# Patient Record
Sex: MALE | Race: BLACK OR AFRICAN AMERICAN | URBAN - METROPOLITAN AREA
[De-identification: names, ages, dates, MRNs, and addresses within clinical notes are randomized per-mention and may not be internally consistent; named-entity substitution may affect disease eponyms.]

---
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
## 2021-01-11 ENCOUNTER — OFFICE VISIT (OUTPATIENT)
Dept: SURGERY | Facility: CLINIC | Age: 21
End: 2021-01-11
Payer: COMMERCIAL

## 2021-01-11 VITALS — BODY MASS INDEX: 35 KG/M2 | WEIGHT: 230 LBS

## 2021-01-11 DIAGNOSIS — N53.19 EJACULATORY DISORDER: Primary | ICD-10-CM

## 2021-01-11 PROCEDURE — 99243 OFF/OP CNSLTJ NEW/EST LOW 30: CPT | Performed by: UROLOGY

## 2021-01-11 NOTE — PROGRESS NOTES
[address] [facility] Urology  Initial Office Consultation    HPI:   Ariella Whitfield is a 24year old male here today for consultation at the request of, and a copy of this note will be sent to, Santa Harper DO.     Patient with history of depression shanta Genitourinary: Penis exhibits no lesions. Musculoskeletal: Normal range of motion. Neurological: He is alert and oriented to person, place, and time. Skin: Skin is warm and dry. Psychiatric: He has a normal mood and affect.        LABS:  No results

## 2021-01-13 ENCOUNTER — TELEPHONE (OUTPATIENT)
Dept: FAMILY MEDICINE CLINIC | Facility: CLINIC | Age: 21
End: 2021-01-13

## 2021-01-13 NOTE — TELEPHONE ENCOUNTER
Pt father calling asking who pt was referred to. Advised he is not on WENDI. Per father pt is confused as well about referral.    Pt father will have pt call back to clarify.

## 2021-07-26 ENCOUNTER — APPOINTMENT (OUTPATIENT)
Dept: CT IMAGING | Facility: HOSPITAL | Age: 21
End: 2021-07-26
Attending: NURSE PRACTITIONER
Payer: MEDICAID

## 2021-07-26 ENCOUNTER — HOSPITAL ENCOUNTER (EMERGENCY)
Facility: HOSPITAL | Age: 21
Discharge: HOME | End: 2021-07-26
Payer: MEDICAID

## 2021-07-26 VITALS
TEMPERATURE: 98 F | BODY MASS INDEX: 45.47 KG/M2 | OXYGEN SATURATION: 99 % | HEIGHT: 68 IN | DIASTOLIC BLOOD PRESSURE: 73 MMHG | SYSTOLIC BLOOD PRESSURE: 121 MMHG | WEIGHT: 300 LBS | HEART RATE: 82 BPM | RESPIRATION RATE: 16 BRPM

## 2021-07-26 DIAGNOSIS — S09.90XA CLOSED HEAD INJURY, INITIAL ENCOUNTER: Primary | ICD-10-CM

## 2021-07-26 DIAGNOSIS — F41.9 ANXIETY: ICD-10-CM

## 2021-07-26 PROCEDURE — 93010 ELECTROCARDIOGRAM REPORT: CPT | Performed by: NURSE PRACTITIONER

## 2021-07-26 PROCEDURE — 99284 EMERGENCY DEPT VISIT MOD MDM: CPT

## 2021-07-26 PROCEDURE — 93005 ELECTROCARDIOGRAM TRACING: CPT

## 2021-07-26 PROCEDURE — 70450 CT HEAD/BRAIN W/O DYE: CPT | Performed by: NURSE PRACTITIONER

## 2021-07-26 RX ORDER — LORAZEPAM 0.5 MG/1
0.5 TABLET ORAL 2 TIMES DAILY PRN
Qty: 10 TABLET | Refills: 0 | Status: ON HOLD | OUTPATIENT
Start: 2021-07-26 | End: 2021-08-07

## 2021-07-26 RX ORDER — LORAZEPAM 1 MG/1
0.5 TABLET ORAL ONCE
Status: COMPLETED | OUTPATIENT
Start: 2021-07-26 | End: 2021-07-26

## 2021-07-26 NOTE — ED INITIAL ASSESSMENT (HPI)
PT c/o suspected anxiety, state he has been feeling his throat get tight, sob, had episode earlier today which went away when he calmed down.

## 2021-07-28 ENCOUNTER — HOSPITAL ENCOUNTER (EMERGENCY)
Facility: HOSPITAL | Age: 21
Discharge: HOME | End: 2021-07-28
Attending: EMERGENCY MEDICINE
Payer: MEDICAID

## 2021-07-28 VITALS
DIASTOLIC BLOOD PRESSURE: 71 MMHG | HEIGHT: 68 IN | RESPIRATION RATE: 16 BRPM | BODY MASS INDEX: 45.47 KG/M2 | SYSTOLIC BLOOD PRESSURE: 113 MMHG | OXYGEN SATURATION: 97 % | WEIGHT: 300 LBS | HEART RATE: 81 BPM | TEMPERATURE: 98 F

## 2021-07-28 DIAGNOSIS — J02.9 SORE THROAT: Primary | ICD-10-CM

## 2021-07-28 PROCEDURE — 99283 EMERGENCY DEPT VISIT LOW MDM: CPT

## 2021-07-28 RX ORDER — DEXAMETHASONE SODIUM PHOSPHATE 4 MG/ML
8 INJECTION, SOLUTION INTRA-ARTICULAR; INTRALESIONAL; INTRAMUSCULAR; INTRAVENOUS; SOFT TISSUE ONCE
Status: COMPLETED | OUTPATIENT
Start: 2021-07-28 | End: 2021-07-28

## 2021-07-28 NOTE — ED PROVIDER NOTES
Patient Seen in: [facility] AND [facility] Emergency Department      History   Patient presents with:  Sore Throat    Stated Complaint:     HPI/Subjective:   HPI    62-year-old male with history of eczema here with complaints of eating something earlier today an (Temporal)   Resp 16   Ht 172.7 cm (5' 8\")   Wt 136.1 kg   SpO2 97%   BMI 45.61 kg/m²         Physical Exam  Vitals and nursing note reviewed. HENT:      Head: Normocephalic.       Mouth/Throat:      Mouth: Mucous membranes are moist.      Comments: Uvul recommended the patient follow-up with their primary care physician to discuss medications such as Chantix and others to assist with smoking cessation.       Medical Record Review: I personally reviewed available prior medical records for any recent pertine

## 2021-07-31 PROBLEM — F32.1 CURRENT MODERATE EPISODE OF MAJOR DEPRESSIVE DISORDER WITHOUT PRIOR EPISODE (HCC): Status: ACTIVE | Noted: 2021-07-31

## 2021-07-31 PROBLEM — F32.2 SEVERE MAJOR DEPRESSION (HCC): Status: ACTIVE | Noted: 2021-07-31

## 2021-07-31 NOTE — ED NOTES
Met further with Meli Wayne. Updated him that there are no beds at [facility]. Explained that I spoke to [facility], [facility] and they are looking at bed availabilty.   Also explained that if admitted he would be admitted as a self pay, would take to a financial co

## 2021-07-31 NOTE — ED NOTES
Spoke to Amrita Lara in the [address] [place] [address] office. She confirmed they have no beds. The earliest they would have beds is Monday, 8/2/21.

## 2021-07-31 NOTE — ED INITIAL ASSESSMENT (HPI)
Pt c/o SI, pt has no plan at this moment. He states he has been struggling w/his anxiety and has been getting worse.

## 2021-07-31 NOTE — ED PROVIDER NOTES
Patient initially seen by my colleague after presenting with SI.   I evaluated the patient and currently patient is calm and cooperative. The psych border order set has been ordered to address on going needs.   The tentative disposition at this point is tr

## 2021-07-31 NOTE — CERTIFICATION
Ref: [address] 5/3-403, 5/3-602, 5/3-607, 5/3-610    5/3-702, 5/3-813, 5/4-306, 5/4-402, 5/4-403    5/4-405, 5/4-501, 5/4-611, 2/8-307   Inpatient Certificate  Re: Hany Garcia    (name)     I personally informed the above-named individual of the pur on his or her behavioral history, to suffer mental or emotional deterioration and is reasonably expected, after such deterioration, to meet the criteria of either paragraph one or paragraph two above;   []  An individual who is developmentally disabled and

## 2021-07-31 NOTE — ED NOTES
Spoke to [employer] sup to review social distance screener, if pt comes to [facility], he does not need any covid restrictions and can have a roommate at this time.

## 2021-07-31 NOTE — PROGRESS NOTES
PSYCHIATRY      Record reviewed, discussed with staff, patient seen. 22 y.o man with major depressive disorder and suicidal ideation. Inpatient psychiatric hospitalization is indicated.   Inpatient certificate written  Ativan prn anxiety while in ED  Fu

## 2021-07-31 NOTE — ED NOTES
Received a call from [facility], mother of Natalia Peralta. She was crying and is worried about Camari. Mother reports a history of depression on and off for a number of years. He used to see a therapist, but is not seeing one currently.   Natalia Peralta was prescri

## 2021-07-31 NOTE — PROGRESS NOTES
07/31/21 0011   Vitals   Temp 98.1 °F (36.7 °C)   Pulse 89   Resp 18   /83   Height and Weight   Height 5' 8\"   Weight 300 lb   BMI (Calculated) 45.6

## 2021-07-31 NOTE — PROGRESS NOTES
07/31/21 0756   COVID Exposure Risk Screening   Have you been practicing social distancing? Yes   Have you been wearing a mask when in the community? Yes  (Received first dose of COVID vaccination 2 or 3 days ago. )   Are the people you live with followi

## 2021-07-31 NOTE — ED PROVIDER NOTES
Patient Seen in: [facility] AND [facility] Emergency Department      History   Patient presents with:  Eval-P    Stated Complaint: eval-p    HPI/Subjective:   HPI    13-year-old male with history of anxiety, here with complaints of suicidal ideation for the past °F (36.7 °C)   Temp src Oral   SpO2 98 %   O2 Device None (Room air)       Current:/83   Pulse 89   Temp 98.1 °F (36.7 °C) (Oral)   Resp 18   Ht 172.7 cm (5' 8\")   Wt 136.1 kg   SpO2 98%   BMI 45.61 kg/m²         Physical Exam  Vitals and nursing no Urine Negative Negative    Cocaine Urine Negative Negative    Ecstasy Urine Negative Negative    Methadone Urine Negative Negative    Opiate Urine Negative Negative    Oxycodone Urine Negative Negative    PCP Urine Negative Negative    Creatinine Ur Random x10(3) uL    Lymphocyte Absolute 2.36 1.00 - 4.00 x10(3) uL    Monocyte Absolute 0.56 0.10 - 1.00 x10(3) uL    Eosinophil Absolute 0.08 0.00 - 0.70 x10(3) uL    Basophil Absolute 0.02 0.00 - 0.20 x10(3) uL    Immature Granulocyte Absolute 0.02 0.00 - 1.00 and reviewed those reports. Complicating Factors: The patient already has does not have any pertinent problems on file. to contribute to the complexity of his ED evaluation.         EMERGENCY DEPARTMENT MEDICAL DECISION MAKING:  After obtaining the patie

## 2021-07-31 NOTE — ED NOTES
Patient received rights and signed in. Copies provided to patient. Rights and sign in documentation uploaded into patient's chart.

## 2021-07-31 NOTE — ED NOTES
Consulted further with Dr Joanie Snell. Jordan Lakeshia revealed to him that he did experience suicidal thoughts in the past when he was put on an antidepressant in the past.  Jordanusman Asher also revealed that he has thoughts about using father's gun to kill himself.   He denies

## 2021-07-31 NOTE — CONSULTS
[facility]      Report of Psychiatric Consultation    Pipo Mower Patient Status:  Emergency    2000 MRN [id number]   Location [address] Attending Amy E Francisco Javier Calvillo Day # 0 PCP Pete Quick He has been taking some as needed Ativan for anxiety. Routine labs unremarkable and UDS was negative. Past Medical History:   Diagnosis Date   • Anxiety    • Eczema      History reviewed. No pertinent surgical history.   Family History   Problem Relation 07/31/2021     07/31/2021    CO2 22.0 07/31/2021    GLU 75 07/31/2021    CA 8.9 07/31/2021    ALB 3.5 07/31/2021    ALKPHO 95 07/31/2021    BILT 0.6 07/31/2021    TP 8.0 07/31/2021    AST 23 07/31/2021    ALT 43 07/31/2021    ETOH <3 07/31/2021

## 2021-07-31 NOTE — ED NOTES
Updated Dr Disha Peres regarding need for EKG and Liver enzymes for [facility].

## 2021-07-31 NOTE — ED QUICK NOTES
[facility] contacted for bls transfer to [facility], eta 60 min.

## 2021-07-31 NOTE — ED NOTES
Sarah Rosario has been accepted for admission to [facility] under the care of Dr Darshan Pantoja. Phone number to call Rn report is [phone].   Kiana Cortez RN

## 2021-07-31 NOTE — PROGRESS NOTES
07/31/21 0756   COVID Exposure Risk Screening   Have you been practicing social distancing? Yes   Have you been wearing a mask when in the community? Yes   Are the people you live with following social distancing and wearing a mask?  Yes  (Lives with [facility]

## 2021-08-01 PROBLEM — E66.01 MORBID OBESITY (HCC): Status: ACTIVE | Noted: 2021-08-01

## 2021-08-01 PROBLEM — F41.0 PANIC DISORDER (EPISODIC PAROXYSMAL ANXIETY): Status: ACTIVE | Noted: 2021-08-01

## 2021-08-01 PROBLEM — F32.2 SEVERE MAJOR DEPRESSION, SINGLE EPISODE, WITHOUT PSYCHOTIC FEATURES (HCC): Status: ACTIVE | Noted: 2021-07-31

## 2021-08-01 PROBLEM — F41.1 GAD (GENERALIZED ANXIETY DISORDER): Status: ACTIVE | Noted: 2021-08-01

## 2021-08-17 NOTE — ED PROVIDER NOTES
Patient Seen in: [facility] Emergency Department      History   Patient presents with: Anxiety/Panic attack    Stated Complaint: panic attack     HPI/Subjective:   21yom w hx hx of anxiety, depression, obesity reports with anxiety.  Sensation of t Pulmonary effort is normal.      Breath sounds: Normal breath sounds. Abdominal:      General: Bowel sounds are normal.      Palpations: Abdomen is soft. Musculoskeletal:         General: No tenderness or deformity. Normal range of motion.       Cervica Hypertrophied adenoids result in mild-moderate narrowing nasopharyngeal airway.                   Impression   CONCLUSION:   1. Negative noncontrast CT brain. 2. Minimal ethmoid sinusitis.    3. Hypertrophied adenoids resulting in mild to moderate narrowi

## 2021-09-05 ENCOUNTER — APPOINTMENT (OUTPATIENT)
Dept: GENERAL RADIOLOGY | Facility: HOSPITAL | Age: 21
End: 2021-09-05
Attending: EMERGENCY MEDICINE
Payer: COMMERCIAL

## 2021-09-05 PROCEDURE — 71045 X-RAY EXAM CHEST 1 VIEW: CPT | Performed by: EMERGENCY MEDICINE

## 2021-09-06 NOTE — ED QUICK NOTES
Round on patient. Updated with anticipated time for assessment. Patient given breakfast menu. Patient verbalizing anxiety.  Went thru medications with patient, he has multiple prescriptions for lorazepam. He states he took one after leaving [place] ED last

## 2021-09-06 NOTE — ED QUICK NOTES
Pt moved to [facility] due to need for room. Pt expressed understanding. Pt currently sleeping at this time.

## 2021-09-06 NOTE — ED QUICK NOTES
Pt provided and explained d/c instructions, at-home care, follow-up, and rx. Pt in nad at this time. Iv access d/c. Vss. Casiano. Ambulatory. A&ox3. Belongings with pt. All questions and concerns addressed.

## 2021-09-06 NOTE — BH LEVEL OF CARE ASSESSMENT
Crisis Evaluation Assessment    Niecy Bowman YOB: 2000   Age 22year old MRN [id number]   Location [address] Attending No att. providers found      Patient's legal sex: male  Patient identifies as: male  Rubén Castillo to get out of bed and states that because he has nothing to do that he spends majority of the time in his bed. Specifically patient reports that he has been struggling with showering.   Patient reports that his last shower was about 3 days ago and prior to the last time he was feeling some passive and active suicidal thoughts was about 2 weeks ago. Patient reports that he noticed that when he was on Prozac that had led him to feel more suicidal despite being on the medication for nearly a month.   Patient de dad's house however patient endorses that he is currently living with his mother  Maura Josiah Secured: N/A  Discussion of Removal of Firearm/Weapon: N/A  Do you have a firearm owner ID card?: No  Collateral for any access to means/firearms/weapons: No c Janes Gill who he states would potentially be able to prescribe him medication however he needed to send in his insurance information.   Patient is not currently set up with an outpatient program at this time and was interested in doing that given the severit or Feelings: Sadness; Anxious;Depressed  Anxiety Level- FRANCISCA only: Moderate  Appropriateness of Affect: Congruent to mood; Appropriate to situation  Range of Affect: Normal  Stability of Affect: Stable  Attitude toward staff: Co-operative;Open  Speech  Rate o been compliant with and just recently started with a therapist who he is going to meet with this Wednesday. Patient reports that his depression has been worsening as well as his anxiety he feels like he is unable to manage.   Patient reports that because h

## 2021-09-06 NOTE — ED QUICK NOTES
Pt states had been on Prozac and ativan x 1 month for anxiety. States when prescription ran out 2-3 days ago and has not been able to reach the dr that prescribed them to refill them. States has been feeling more anxious, with chest tightness.  States also

## 2021-09-06 NOTE — ED INITIAL ASSESSMENT (HPI)
Pt has been on prozac and ativan for the last few months and ran out of medication on Friday, states he feels like his \"chest is closing around his heart, and he has ringing in his ears. \"

## 2021-09-06 NOTE — ED INITIAL ASSESSMENT (HPI)
Pt presents to ED for SI, pt states went to [facility] for sore throat causing him anxiety, also needing refill on Prozac and Ativan but was unable to get it. Pt states no plan just states, \"I just feel tired\".

## 2021-09-06 NOTE — BH LEVEL OF CARE REASSESSMENT
Level of Care Reassessment Note    The prior assessment completed on 09/06/21 has been reviewed.   The level of care recommended was declined/postponed due to:   Refused Treatment Reason: Needs time to Decide/Discuss      Patient presents today for reassess Intention  Describe: Pt has a hx of SI with intent and was recently admitted to [facility] on 7/31.  Pt denies any hx of suicide attempts  Family History or Personal Lived Experience of Loss or Near Loss by Suicide: Denies      Reassessment  Have you harmed someon Osvaldo Gutierrez is a 22year old male who [facility] [place] [facility] d/t ongoing anxiety and SI. Pt was assessed at [place] [facility] 5 hours prior to coming to [facility] of anxiety and running out of his prescribed psychotropic medication.  Pt reports returning home with Note from Previous Level Of Care Assessment    Crisis Evaluation Assessment           Guru Sherrie YOB: 2000   Age 22year old MRN [id number]   Location [address] Attending No att. providers found agitation or aggression. Patient reports that he has been struggling with the motivation to get out of bed and states that because he has nothing to do that he spends majority of the time in his bed.   Specifically patient reports that he has been struggli current passive or active suicidal thoughts/plans or recent attempts. Patient reports that the last time he was feeling some passive and active suicidal thoughts was about 2 weeks ago.   Patient reports that he noticed that when he was on Prozac that had l weapons; patient reports according to her previous assessment that he did have access to a gun at his dad's house however patient endorses that he is currently living with his mother  Shanda Johnson Secured: N/A  Discussion of Removal of Firearm/Weapon: N/ needed and trazodone 25 to 50 mg per night as needed. Patient does started with a therapist 2 days ago Dr. Toya Perez who he states would potentially be able to prescribe him medication however he needed to send in his insurance information.   Jerardo None  Posture: Relaxed  Rate of Movement: Normal  Mood and Affect  Mood or Feelings: Sadness; Anxious;Depressed  Anxiety Level- FRANCISCA only: Moderate  Appropriateness of Affect: Congruent to mood; Appropriate to situation  Range of Affect: Normal  Stability of Phu at the end of July 2021. Patient was prescribed medication which he has been compliant with and just recently started with a therapist who he is going to meet with this Wednesday.   Patient reports that his depression has been worsening as well as his

## 2021-09-06 NOTE — ED PROVIDER NOTES
Patient Seen in: [facility] Emergency Department      History   Patient presents with:  Eval-P    Stated Complaint: Eval P    HPI/Subjective:   HPI    Patient presents with suicidal ideations.   The patient states that he has a history of a lot of an Normocephalic, atraumatic, pupils equal round and reactive to light, oropharynx clear, uvula midline. Neck: Supple. Cardiovascular: Regular rate and rhythm, no murmurs. Respiratory: Lungs clear to auscultation. Extremities: No CCE. Skin: Warm and dry. criteria for inpatient psychiatric treatment at this time. He has Ativan as prescribed by the physician that saw him yesterday that he can take in the meantime. He was counseled to return if his condition worsened.              Disposition and Plan     Cl

## 2021-09-06 NOTE — ED PROVIDER NOTES
Patient Seen in: [facility] Emergency Department      History   Patient presents with:   Anxiety/Panic attack  Chest Pain Angina    Stated Complaint:     HPI/Subjective:   HPI    Patient presents to the emergency department complaining of anxiety a Eyes:      Conjunctiva/sclera: Conjunctivae normal.   Cardiovascular:      Rate and Rhythm: Normal rate and regular rhythm. Heart sounds: No murmur heard. Pulmonary:      Effort: Pulmonary effort is normal. No respiratory distress.       Breath s Monitor: Pulse Readings from Last 1 Encounters:  09/05/21 : 68  , sinus,      Radiology findings:CXR reviewed, normal  Radiology exams  Viewed and reviewed by myself and findings discussed with patient including need for follow up

## 2021-09-09 PROBLEM — F32.2 SEVERE MAJOR DEPRESSION, SINGLE EPISODE, WITHOUT PSYCHOTIC FEATURES (HCC): Status: RESOLVED | Noted: 2021-07-31 | Resolved: 2021-09-09

## 2021-10-07 ENCOUNTER — OFFICE VISIT (OUTPATIENT)
Dept: FAMILY MEDICINE CLINIC | Facility: CLINIC | Age: 21
End: 2021-10-07
Payer: COMMERCIAL

## 2021-10-07 VITALS
HEART RATE: 73 BPM | HEIGHT: 68 IN | DIASTOLIC BLOOD PRESSURE: 88 MMHG | SYSTOLIC BLOOD PRESSURE: 138 MMHG | WEIGHT: 289.81 LBS | BODY MASS INDEX: 43.92 KG/M2

## 2021-10-07 DIAGNOSIS — N53.19 EJACULATORY DISORDER: ICD-10-CM

## 2021-10-07 DIAGNOSIS — H93.13 TINNITUS OF BOTH EARS: Primary | ICD-10-CM

## 2021-10-07 PROCEDURE — 99213 OFFICE O/P EST LOW 20 MIN: CPT | Performed by: FAMILY MEDICINE

## 2021-10-07 PROCEDURE — 3079F DIAST BP 80-89 MM HG: CPT | Performed by: FAMILY MEDICINE

## 2021-10-07 PROCEDURE — 3008F BODY MASS INDEX DOCD: CPT | Performed by: FAMILY MEDICINE

## 2021-10-07 PROCEDURE — 3075F SYST BP GE 130 - 139MM HG: CPT | Performed by: FAMILY MEDICINE

## 2021-10-07 NOTE — PROGRESS NOTES
Pipo Vaughn is a 22year old male.   Patient presents with:  Ear Problem: ringing in the ears that comes and goes, pt experiencing difficulty hearing at times, denies pain  Sore Throat: pt feels that tonsils swell often but no known hx of strep, someti rashes  RESPIRATORY: denies shortness of breath, cough, wheezing  CARDIOVASCULAR: denies chest pain on exertion, palpitations, swelling in feet  GI: denies abdominal pain and denies heartburn, nausea or vomiting  : No Pain on urination, change in the col

## 2022-03-17 ENCOUNTER — TELEPHONE (OUTPATIENT)
Dept: FAMILY MEDICINE CLINIC | Facility: CLINIC | Age: 22
End: 2022-03-17

## 2022-03-17 ENCOUNTER — HOSPITAL ENCOUNTER (EMERGENCY)
Facility: HOSPITAL | Age: 22
Discharge: HOME | End: 2022-03-17
Payer: COMMERCIAL

## 2022-03-17 VITALS
TEMPERATURE: 98 F | BODY MASS INDEX: 44.43 KG/M2 | SYSTOLIC BLOOD PRESSURE: 102 MMHG | WEIGHT: 300 LBS | RESPIRATION RATE: 20 BRPM | HEART RATE: 91 BPM | OXYGEN SATURATION: 100 % | DIASTOLIC BLOOD PRESSURE: 78 MMHG | HEIGHT: 69 IN

## 2022-03-17 DIAGNOSIS — J02.0 STREP PHARYNGITIS: Primary | ICD-10-CM

## 2022-03-17 DIAGNOSIS — R00.2 PALPITATIONS: ICD-10-CM

## 2022-03-17 LAB
ANION GAP SERPL CALC-SCNC: 6 MMOL/L (ref 0–18)
BASOPHILS # BLD AUTO: 0.04 X10(3) UL (ref 0–0.2)
BASOPHILS NFR BLD AUTO: 0.6 %
BUN BLD-MCNC: 14 MG/DL (ref 7–18)
BUN/CREAT SERPL: 14 (ref 10–20)
CALCIUM BLD-MCNC: 9.2 MG/DL (ref 8.5–10.1)
CHLORIDE SERPL-SCNC: 104 MMOL/L (ref 98–112)
CO2 SERPL-SCNC: 25 MMOL/L (ref 21–32)
CREAT BLD-MCNC: 1 MG/DL
DEPRECATED RDW RBC AUTO: 39.5 FL (ref 35.1–46.3)
EOSINOPHIL # BLD AUTO: 0.19 X10(3) UL (ref 0–0.7)
EOSINOPHIL NFR BLD AUTO: 2.6 %
ERYTHROCYTE [DISTWIDTH] IN BLOOD BY AUTOMATED COUNT: 12.7 % (ref 11–15)
GLUCOSE BLD-MCNC: 87 MG/DL (ref 70–99)
HCT VFR BLD AUTO: 47.7 %
HGB BLD-MCNC: 16 G/DL
IMM GRANULOCYTES # BLD AUTO: 0.01 X10(3) UL (ref 0–1)
IMM GRANULOCYTES NFR BLD: 0.1 %
LYMPHOCYTES # BLD AUTO: 1.3 X10(3) UL (ref 1–4)
LYMPHOCYTES NFR BLD AUTO: 18 %
MCHC RBC AUTO-ENTMCNC: 33.5 G/DL (ref 31–37)
MCV RBC AUTO: 84.7 FL
MONOCYTES # BLD AUTO: 0.54 X10(3) UL (ref 0.1–1)
MONOCYTES NFR BLD AUTO: 7.5 %
NEUTROPHILS # BLD AUTO: 5.13 X10 (3) UL (ref 1.5–7.7)
NEUTROPHILS # BLD AUTO: 5.13 X10(3) UL (ref 1.5–7.7)
NEUTROPHILS NFR BLD AUTO: 71.2 %
OSMOLALITY SERPL CALC.SUM OF ELEC: 280 MOSM/KG (ref 275–295)
PLATELET # BLD AUTO: 317 10(3)UL (ref 150–450)
POTASSIUM SERPL-SCNC: 4 MMOL/L (ref 3.5–5.1)
RBC # BLD AUTO: 5.63 X10(6)UL
S PYO AG THROAT QL: POSITIVE
SODIUM SERPL-SCNC: 135 MMOL/L (ref 136–145)
WBC # BLD AUTO: 7.2 X10(3) UL (ref 4–11)

## 2022-03-17 PROCEDURE — 85025 COMPLETE CBC W/AUTO DIFF WBC: CPT

## 2022-03-17 PROCEDURE — 93010 ELECTROCARDIOGRAM REPORT: CPT | Performed by: INTERNAL MEDICINE

## 2022-03-17 PROCEDURE — 93005 ELECTROCARDIOGRAM TRACING: CPT

## 2022-03-17 PROCEDURE — 87880 STREP A ASSAY W/OPTIC: CPT

## 2022-03-17 PROCEDURE — 36415 COLL VENOUS BLD VENIPUNCTURE: CPT

## 2022-03-17 PROCEDURE — 99284 EMERGENCY DEPT VISIT MOD MDM: CPT

## 2022-03-17 PROCEDURE — 80048 BASIC METABOLIC PNL TOTAL CA: CPT

## 2022-03-17 RX ORDER — PENICILLIN V POTASSIUM 500 MG/1
500 TABLET ORAL 2 TIMES DAILY
Qty: 20 TABLET | Refills: 0 | Status: SHIPPED | OUTPATIENT
Start: 2022-03-17 | End: 2022-03-30

## 2022-03-17 NOTE — ED INITIAL ASSESSMENT (HPI)
Patient reports new prescription of lamotrigine about 2 weeks ago and has been feeling heart palpitations and dizziness since. Denies chest pain.

## 2022-03-17 NOTE — TELEPHONE ENCOUNTER
Patient is currently in ER. Patient feeling \"lightheaded, stunned, feel like stuck in moment\" while on medication. Lamotrigine given by psychiatrist.   Started 2-3 weeks ago. Patient called because he wasn't being called. He hung up after he was called.

## 2022-03-17 NOTE — ED QUICK NOTES
Pt  has been having intermittent heart palpitations x 3 days. States has started new medication about 2 wks ago. States also having a sore throat. Pt now feels that the heart palpitations are related to his anxiety.

## 2022-03-18 ENCOUNTER — LAB ENCOUNTER (OUTPATIENT)
Dept: LAB | Age: 22
End: 2022-03-18
Attending: FAMILY MEDICINE
Payer: COMMERCIAL

## 2022-03-18 ENCOUNTER — OFFICE VISIT (OUTPATIENT)
Dept: FAMILY MEDICINE CLINIC | Facility: CLINIC | Age: 22
End: 2022-03-18
Payer: COMMERCIAL

## 2022-03-18 VITALS
HEART RATE: 70 BPM | TEMPERATURE: 98 F | HEIGHT: 69 IN | BODY MASS INDEX: 44.49 KG/M2 | DIASTOLIC BLOOD PRESSURE: 80 MMHG | SYSTOLIC BLOOD PRESSURE: 132 MMHG | WEIGHT: 300.38 LBS

## 2022-03-18 DIAGNOSIS — E66.01 MORBID OBESITY (HCC): ICD-10-CM

## 2022-03-18 DIAGNOSIS — N48.89 PEARLY PENILE PAPULES: Primary | ICD-10-CM

## 2022-03-18 DIAGNOSIS — Z00.00 ROUTINE PHYSICAL EXAMINATION: ICD-10-CM

## 2022-03-18 DIAGNOSIS — Z11.3 SCREEN FOR STD (SEXUALLY TRANSMITTED DISEASE): ICD-10-CM

## 2022-03-18 PROBLEM — F13.99 SEDATIVE, HYPNOTIC OR ANXIOLYTIC USE, UNSPECIFIED WITH UNSPECIFIED SEDATIVE, HYPNOTIC OR ANXIOLYTIC-INDUCED DISORDER (HCC): Status: ACTIVE | Noted: 2022-03-18

## 2022-03-18 PROBLEM — F13.29 SEDATIVE, HYPNOTIC OR ANXIOLYTIC DEPENDENCE WITH UNSPECIFIED SEDATIVE, HYPNOTIC OR ANXIOLYTIC-INDUCED DISORDER (HCC): Status: ACTIVE | Noted: 2022-03-18

## 2022-03-18 PROBLEM — F13.20 SEDATIVE, HYPNOTIC OR ANXIOLYTIC DEPENDENCE, UNCOMPLICATED (HCC): Status: ACTIVE | Noted: 2022-03-18

## 2022-03-18 PROCEDURE — 3079F DIAST BP 80-89 MM HG: CPT | Performed by: FAMILY MEDICINE

## 2022-03-18 PROCEDURE — 3008F BODY MASS INDEX DOCD: CPT | Performed by: FAMILY MEDICINE

## 2022-03-18 PROCEDURE — 3075F SYST BP GE 130 - 139MM HG: CPT | Performed by: FAMILY MEDICINE

## 2022-03-18 PROCEDURE — 99395 PREV VISIT EST AGE 18-39: CPT | Performed by: FAMILY MEDICINE

## 2022-03-18 RX ORDER — LAMOTRIGINE 25 MG/1
TABLET ORAL
COMMUNITY
Start: 2022-02-28 | End: 2022-03-30

## 2022-03-18 RX ORDER — CLONAZEPAM 0.5 MG/1
0.5 TABLET ORAL 2 TIMES DAILY
COMMUNITY
Start: 2022-01-27 | End: 2022-03-18

## 2022-03-19 ENCOUNTER — HOSPITAL ENCOUNTER (EMERGENCY)
Facility: HOSPITAL | Age: 22
Discharge: HOME | End: 2022-03-19
Attending: EMERGENCY MEDICINE
Payer: COMMERCIAL

## 2022-03-19 VITALS
HEART RATE: 93 BPM | RESPIRATION RATE: 20 BRPM | BODY MASS INDEX: 45.47 KG/M2 | TEMPERATURE: 99 F | DIASTOLIC BLOOD PRESSURE: 71 MMHG | SYSTOLIC BLOOD PRESSURE: 116 MMHG | OXYGEN SATURATION: 97 % | WEIGHT: 300 LBS | HEIGHT: 68 IN

## 2022-03-19 DIAGNOSIS — J02.0 STREPTOCOCCAL SORE THROAT: Primary | ICD-10-CM

## 2022-03-19 PROCEDURE — 96372 THER/PROPH/DIAG INJ SC/IM: CPT

## 2022-03-19 PROCEDURE — 99283 EMERGENCY DEPT VISIT LOW MDM: CPT

## 2022-03-19 RX ORDER — KETOROLAC TROMETHAMINE 15 MG/ML
15 INJECTION, SOLUTION INTRAMUSCULAR; INTRAVENOUS ONCE
Status: COMPLETED | OUTPATIENT
Start: 2022-03-19 | End: 2022-03-19

## 2022-03-19 RX ORDER — DEXAMETHASONE SODIUM PHOSPHATE 4 MG/ML
10 INJECTION, SOLUTION INTRA-ARTICULAR; INTRALESIONAL; INTRAMUSCULAR; INTRAVENOUS; SOFT TISSUE ONCE
Status: COMPLETED | OUTPATIENT
Start: 2022-03-19 | End: 2022-03-19

## 2022-03-19 NOTE — ED INITIAL ASSESSMENT (HPI)
Pt presents to ED via EMS for strep throat. Pt states he was diagnosed earlier today but states his throat feels worse.

## 2022-03-20 LAB
CHLAMYDIA TRACHOMATIS$RNA, TMA: NOT DETECTED
CHOL/HDLC RATIO: 4 (CALC)
CHOLESTEROL, TOTAL: 160 MG/DL
HDL CHOLESTEROL: 40 MG/DL
LDL-CHOLESTEROL: 104 MG/DL (CALC)
NEISSERIA GONORRHOEAE$RNA, TMA: NOT DETECTED
NON-HDL CHOLESTEROL: 120 MG/DL (CALC)
SIGNAL TO CUT-OFF: 0.1
TRIGLYCERIDES: 75 MG/DL

## 2022-03-23 PROBLEM — F33.2 MDD (MAJOR DEPRESSIVE DISORDER), RECURRENT EPISODE, SEVERE (HCC): Status: ACTIVE | Noted: 2022-03-23

## 2022-03-23 NOTE — ED QUICK NOTES
Report called to [facility], spoke with sakina. [facility] called for bls transfer. Eta 60-90min.

## 2022-03-23 NOTE — ED QUICK NOTES
Patient calm and cooperative, asking for food. Packaged lunch without silverware provided to patient.

## 2022-03-23 NOTE — ED QUICK NOTES
Rounding Completed    Bed is locked and in lowest position. Sitter at th bed side. Patient A&O, calm & dev.

## 2022-03-23 NOTE — PROGRESS NOTES
03/23/22 0051   COVID Exposure Risk Screening   Do you have any of the following new or worsening symptoms of COVID-19? None  (recently dx with strep and is on an antibiotic)   Have you been diagnosed with COVID-19 within the past 10 days? No   Are you awaiting COVID-19 test results or do you have a COVID-19 test scheduled? No   In the past 10 days, have you been in contact with someone who was confirmed or suspected to have COVID-19? No     Patient reports having 1 dose of the vaccine. According to records, overdue for the 2nd shot and no booster.

## 2022-03-23 NOTE — ED INITIAL ASSESSMENT (HPI)
Patient arrives via [facility] with complaints of:  Excessive irritability, Intrusive thoughts, Anxiety, blurry vision x2-3 days  2 weeks of confusion and \"concussion symptoms\", patient states he's been clumsy lately. Stopped Lamotrigine on 3/21/22. New rx Lexapro, did not start it yet. Patient reports he took his anxiety medication (unsure of name) during the day today.

## 2022-03-23 NOTE — ED NOTES
This writer discussed the plan of care, admission process and mental health rights. Patient agreed to voluntary behavioral health treatment and was provided a copy of all documents signed. A copy was scanned to EMR and originals were placed in the chart. Patient is requesting to go back to [facility]. Patient gave verbal consent for information regarding his POC can be provided to his mother Julia Kodi, [phone].

## 2022-03-23 NOTE — ED NOTES
Angela Sheldon has been accepted for admission by Dr José Luis Elliott. He will be admitted to room 822B. SBAR Report completed with Maria Eugenia @ ext [phone]. Phone number to call nurse report provided to Altagracia Landin RN.

## 2022-03-23 NOTE — ED PROVIDER NOTES
Patient endorsed to me in setting of anxiety and depression. Pt medically cleared. Pt has concerns for safety at home during discussion with  Monika Wiley. She also d/w patient's mother. Pt has a lot of medicine changes recently and intrusive suicidal thoughts. Plan for psychiatric transfer.

## 2022-04-22 ENCOUNTER — NURSE TRIAGE (OUTPATIENT)
Dept: FAMILY MEDICINE CLINIC | Facility: CLINIC | Age: 22
End: 2022-04-22

## 2022-06-24 ENCOUNTER — HOSPITAL ENCOUNTER (EMERGENCY)
Facility: HOSPITAL | Age: 22
Discharge: LEFT BEFORE DISCHARGE | End: 2022-06-24
Payer: COMMERCIAL

## 2022-06-24 NOTE — ED QUICK NOTES
rn attempted to call patient multiple times, looked in all areas of the waiting room and bathroom.  rn called patient's phone without answer, considered LWBS

## 2022-12-05 ENCOUNTER — NURSE TRIAGE (OUTPATIENT)
Dept: FAMILY MEDICINE CLINIC | Facility: CLINIC | Age: 22
End: 2022-12-05

## 2022-12-07 ENCOUNTER — OFFICE VISIT (OUTPATIENT)
Dept: FAMILY MEDICINE CLINIC | Facility: CLINIC | Age: 22
End: 2022-12-07
Payer: COMMERCIAL

## 2022-12-07 ENCOUNTER — LAB ENCOUNTER (OUTPATIENT)
Dept: LAB | Age: 22
End: 2022-12-07
Attending: FAMILY MEDICINE
Payer: COMMERCIAL

## 2022-12-07 VITALS
WEIGHT: 315 LBS | RESPIRATION RATE: 19 BRPM | HEART RATE: 78 BPM | HEIGHT: 68 IN | SYSTOLIC BLOOD PRESSURE: 133 MMHG | DIASTOLIC BLOOD PRESSURE: 82 MMHG | BODY MASS INDEX: 47.74 KG/M2 | OXYGEN SATURATION: 98 %

## 2022-12-07 DIAGNOSIS — E66.01 CLASS 3 SEVERE OBESITY WITHOUT SERIOUS COMORBIDITY WITH BODY MASS INDEX (BMI) OF 50.0 TO 59.9 IN ADULT, UNSPECIFIED OBESITY TYPE (HCC): ICD-10-CM

## 2022-12-07 DIAGNOSIS — R63.5 WEIGHT GAIN: ICD-10-CM

## 2022-12-07 DIAGNOSIS — Z11.3 SCREENING EXAMINATION FOR STD (SEXUALLY TRANSMITTED DISEASE): Primary | ICD-10-CM

## 2022-12-07 PROCEDURE — 99214 OFFICE O/P EST MOD 30 MIN: CPT | Performed by: FAMILY MEDICINE

## 2022-12-07 PROCEDURE — 3079F DIAST BP 80-89 MM HG: CPT | Performed by: FAMILY MEDICINE

## 2022-12-07 PROCEDURE — 3075F SYST BP GE 130 - 139MM HG: CPT | Performed by: FAMILY MEDICINE

## 2022-12-07 PROCEDURE — 3008F BODY MASS INDEX DOCD: CPT | Performed by: FAMILY MEDICINE

## 2022-12-07 RX ORDER — GABAPENTIN 100 MG/1
CAPSULE ORAL
Refills: 0 | Status: CANCELLED | OUTPATIENT
Start: 2022-12-07

## 2022-12-07 RX ORDER — DESVENLAFAXINE 50 MG/1
50 TABLET, EXTENDED RELEASE ORAL DAILY
COMMUNITY
Start: 2022-11-08 | End: 2022-12-13

## 2022-12-07 RX ORDER — ESCITALOPRAM OXALATE 10 MG/1
15 TABLET ORAL DAILY
Qty: 45 TABLET | Refills: 0 | Status: CANCELLED | OUTPATIENT
Start: 2022-12-07

## 2022-12-07 RX ORDER — DESVENLAFAXINE 50 MG/1
50 TABLET, EXTENDED RELEASE ORAL DAILY
Qty: 90 TABLET | Refills: 0 | Status: CANCELLED | OUTPATIENT
Start: 2022-12-07

## 2022-12-08 LAB
CHLAMYDIA TRACHOMATIS$RNA, TMA: NOT DETECTED
NEISSERIA GONORRHOEAE$RNA, TMA: NOT DETECTED

## 2022-12-13 ENCOUNTER — TELEPHONE (OUTPATIENT)
Dept: FAMILY MEDICINE CLINIC | Facility: CLINIC | Age: 22
End: 2022-12-13

## 2022-12-13 RX ORDER — DESVENLAFAXINE 50 MG/1
50 TABLET, EXTENDED RELEASE ORAL DAILY
Qty: 90 TABLET | Refills: 0 | Status: SHIPPED | OUTPATIENT
Start: 2022-12-13 | End: 2022-12-19

## 2022-12-13 NOTE — TELEPHONE ENCOUNTER
Spoke to patient who stated he did receive medication from his psychiatrist Dr Maria Luisa Hughes from [facility] in [facility] but the patient has been recently out of employment and is trying to get insurance ( medicaid) and has been out of medication for the past 3 days. I stated I would relay message to Dr Pretty Cota.

## 2022-12-18 ENCOUNTER — HOSPITAL ENCOUNTER (EMERGENCY)
Facility: HOSPITAL | Age: 22
Discharge: HOME | End: 2022-12-19
Attending: EMERGENCY MEDICINE
Payer: MEDICAID

## 2022-12-18 DIAGNOSIS — R07.9 CHEST PAIN OF UNCERTAIN ETIOLOGY: ICD-10-CM

## 2022-12-18 DIAGNOSIS — J02.0 STREP PHARYNGITIS: Primary | ICD-10-CM

## 2022-12-18 PROCEDURE — 36415 COLL VENOUS BLD VENIPUNCTURE: CPT

## 2022-12-18 PROCEDURE — 99284 EMERGENCY DEPT VISIT MOD MDM: CPT

## 2022-12-18 PROCEDURE — 93010 ELECTROCARDIOGRAM REPORT: CPT

## 2022-12-18 PROCEDURE — 93005 ELECTROCARDIOGRAM TRACING: CPT

## 2022-12-18 PROCEDURE — 99285 EMERGENCY DEPT VISIT HI MDM: CPT

## 2022-12-19 ENCOUNTER — APPOINTMENT (OUTPATIENT)
Dept: GENERAL RADIOLOGY | Facility: HOSPITAL | Age: 22
End: 2022-12-19
Attending: EMERGENCY MEDICINE
Payer: MEDICAID

## 2022-12-19 ENCOUNTER — TELEPHONE (OUTPATIENT)
Dept: FAMILY MEDICINE CLINIC | Facility: CLINIC | Age: 22
End: 2022-12-19

## 2022-12-19 VITALS
SYSTOLIC BLOOD PRESSURE: 122 MMHG | HEART RATE: 70 BPM | HEIGHT: 69 IN | RESPIRATION RATE: 23 BRPM | BODY MASS INDEX: 46.65 KG/M2 | TEMPERATURE: 98 F | OXYGEN SATURATION: 99 % | WEIGHT: 315 LBS | DIASTOLIC BLOOD PRESSURE: 80 MMHG

## 2022-12-19 LAB
ANION GAP SERPL CALC-SCNC: 4 MMOL/L (ref 0–18)
ATRIAL RATE: 85 BPM
BASOPHILS # BLD AUTO: 0.03 X10(3) UL (ref 0–0.2)
BASOPHILS NFR BLD AUTO: 0.4 %
BUN BLD-MCNC: 16 MG/DL (ref 7–18)
BUN/CREAT SERPL: 16.2 (ref 10–20)
CALCIUM BLD-MCNC: 8.9 MG/DL (ref 8.5–10.1)
CHLORIDE SERPL-SCNC: 106 MMOL/L (ref 98–112)
CO2 SERPL-SCNC: 29 MMOL/L (ref 21–32)
CREAT BLD-MCNC: 0.99 MG/DL
DEPRECATED RDW RBC AUTO: 37.9 FL (ref 35.1–46.3)
EOSINOPHIL # BLD AUTO: 0.21 X10(3) UL (ref 0–0.7)
EOSINOPHIL NFR BLD AUTO: 2.9 %
ERYTHROCYTE [DISTWIDTH] IN BLOOD BY AUTOMATED COUNT: 12.6 % (ref 11–15)
FLUAV + FLUBV RNA SPEC NAA+PROBE: NEGATIVE
FLUAV + FLUBV RNA SPEC NAA+PROBE: NEGATIVE
GFR SERPLBLD BASED ON 1.73 SQ M-ARVRAT: 111 ML/MIN/1.73M2 (ref 60–?)
GLUCOSE BLD-MCNC: 89 MG/DL (ref 70–99)
HCT VFR BLD AUTO: 42.7 %
HGB BLD-MCNC: 14.1 G/DL
IMM GRANULOCYTES # BLD AUTO: 0.02 X10(3) UL (ref 0–1)
IMM GRANULOCYTES NFR BLD: 0.3 %
LYMPHOCYTES # BLD AUTO: 1.57 X10(3) UL (ref 1–4)
LYMPHOCYTES NFR BLD AUTO: 21.4 %
MCH RBC QN AUTO: 27.8 PG (ref 26–34)
MCHC RBC AUTO-ENTMCNC: 33 G/DL (ref 31–37)
MCV RBC AUTO: 84.2 FL
MONOCYTES # BLD AUTO: 0.39 X10(3) UL (ref 0.1–1)
MONOCYTES NFR BLD AUTO: 5.3 %
NEUTROPHILS # BLD AUTO: 5.1 X10 (3) UL (ref 1.5–7.7)
NEUTROPHILS # BLD AUTO: 5.1 X10(3) UL (ref 1.5–7.7)
NEUTROPHILS NFR BLD AUTO: 69.7 %
OSMOLALITY SERPL CALC.SUM OF ELEC: 289 MOSM/KG (ref 275–295)
P AXIS: 77 DEGREES
P-R INTERVAL: 158 MS
PLATELET # BLD AUTO: 293 10(3)UL (ref 150–450)
POTASSIUM SERPL-SCNC: 4.1 MMOL/L (ref 3.5–5.1)
Q-T INTERVAL: 338 MS
QRS DURATION: 98 MS
QTC CALCULATION (BEZET): 402 MS
R AXIS: 32 DEGREES
RBC # BLD AUTO: 5.07 X10(6)UL
RSV RNA SPEC NAA+PROBE: NEGATIVE
S PYO AG THROAT QL: POSITIVE
SARS-COV-2 RNA RESP QL NAA+PROBE: NOT DETECTED
SODIUM SERPL-SCNC: 139 MMOL/L (ref 136–145)
T AXIS: 34 DEGREES
TROPONIN I HIGH SENSITIVITY: 5 NG/L
VENTRICULAR RATE: 85 BPM
WBC # BLD AUTO: 7.3 X10(3) UL (ref 4–11)

## 2022-12-19 PROCEDURE — 87880 STREP A ASSAY W/OPTIC: CPT

## 2022-12-19 PROCEDURE — 0241U SARS-COV-2/FLU A AND B/RSV BY PCR (GENEXPERT): CPT | Performed by: EMERGENCY MEDICINE

## 2022-12-19 PROCEDURE — 80048 BASIC METABOLIC PNL TOTAL CA: CPT | Performed by: EMERGENCY MEDICINE

## 2022-12-19 PROCEDURE — 85025 COMPLETE CBC W/AUTO DIFF WBC: CPT | Performed by: EMERGENCY MEDICINE

## 2022-12-19 PROCEDURE — 84484 ASSAY OF TROPONIN QUANT: CPT | Performed by: EMERGENCY MEDICINE

## 2022-12-19 PROCEDURE — 71045 X-RAY EXAM CHEST 1 VIEW: CPT | Performed by: EMERGENCY MEDICINE

## 2022-12-19 RX ORDER — PENICILLIN V POTASSIUM 500 MG/1
500 TABLET ORAL 2 TIMES DAILY
Qty: 20 TABLET | Refills: 0 | Status: SHIPPED | OUTPATIENT
Start: 2022-12-19 | End: 2022-12-21 | Stop reason: ALTCHOICE

## 2022-12-19 RX ORDER — DESVENLAFAXINE 50 MG/1
50 TABLET, EXTENDED RELEASE ORAL DAILY
Qty: 90 TABLET | Refills: 1 | Status: SHIPPED | OUTPATIENT
Start: 2022-12-19

## 2022-12-19 RX ORDER — GABAPENTIN 100 MG/1
CAPSULE ORAL
Qty: 90 CAPSULE | Refills: 1 | Status: SHIPPED | OUTPATIENT
Start: 2022-12-19

## 2022-12-19 NOTE — DISCHARGE INSTRUCTIONS
Discussed the importance of blood sugar control in the prevention of ocular complications. Return to emergency room for any facial asymmetry, drooling, inability to swallow, shortness of breath, any worsening symptoms. Please follow-up with your primary care provider in the next few days  Return to emergency department for any worsening symptoms including but not limited to worsening chest pain, shortness of breath, worsening symptoms with exertion, lower extremity swelling, weakness, numbness, abdominal pain, etc. Please follow with your primary care provider in the next few days for reevaluation of your symptoms. The Emergency Department is not intended to be a substitute for an effort to provide complete medical care. The imaging, if any, have often been interpreted on a preliminary basis pending final reading by the radiologist. If your blood pressure was greater than 140/90, please have this blood pressure rechecked by your primary care provider in the next several days.

## 2022-12-19 NOTE — ED INITIAL ASSESSMENT (HPI)
Pt AOx4 C/C intermittent sharp left chest pain, non radiating, and intermittent dyspnea x 3 days. PMH anxiety, states this feels different. Normal work of breathing in triage. Adds that he's had a sore throat for a week. Was checked for STDs at [facility] clinic. States that he has been without his gabapentin and antidepressant for a few days.

## 2022-12-19 NOTE — TELEPHONE ENCOUNTER
Pharmacy states that they did not receive the script sent on . Resent. Called patient, confirmed name and . Informed. Patient verbalized understanding and agrees.

## 2022-12-20 ENCOUNTER — HOSPITAL ENCOUNTER (EMERGENCY)
Facility: HOSPITAL | Age: 22
Discharge: HOME | End: 2022-12-20
Attending: EMERGENCY MEDICINE
Payer: COMMERCIAL

## 2022-12-20 VITALS
SYSTOLIC BLOOD PRESSURE: 132 MMHG | HEART RATE: 86 BPM | TEMPERATURE: 99 F | WEIGHT: 315 LBS | HEIGHT: 69 IN | DIASTOLIC BLOOD PRESSURE: 84 MMHG | RESPIRATION RATE: 16 BRPM | BODY MASS INDEX: 46.65 KG/M2 | OXYGEN SATURATION: 98 %

## 2022-12-20 DIAGNOSIS — R53.1 WEAKNESS GENERALIZED: Primary | ICD-10-CM

## 2022-12-20 LAB
ANION GAP SERPL CALC-SCNC: 5 MMOL/L (ref 0–18)
BUN BLD-MCNC: 13 MG/DL (ref 7–18)
BUN/CREAT SERPL: 15.3 (ref 10–20)
CALCIUM BLD-MCNC: 9 MG/DL (ref 8.5–10.1)
CHLORIDE SERPL-SCNC: 109 MMOL/L (ref 98–112)
CO2 SERPL-SCNC: 25 MMOL/L (ref 21–32)
CREAT BLD-MCNC: 0.85 MG/DL
D DIMER PPP FEU-MCNC: 0.3 UG/ML FEU (ref ?–0.5)
GFR SERPLBLD BASED ON 1.73 SQ M-ARVRAT: 127 ML/MIN/1.73M2 (ref 60–?)
GLUCOSE BLD-MCNC: 119 MG/DL (ref 70–99)
OSMOLALITY SERPL CALC.SUM OF ELEC: 289 MOSM/KG (ref 275–295)
POTASSIUM SERPL-SCNC: 3.8 MMOL/L (ref 3.5–5.1)
SODIUM SERPL-SCNC: 139 MMOL/L (ref 136–145)

## 2022-12-20 PROCEDURE — 87591 N.GONORRHOEAE DNA AMP PROB: CPT | Performed by: EMERGENCY MEDICINE

## 2022-12-20 PROCEDURE — 85379 FIBRIN DEGRADATION QUANT: CPT | Performed by: EMERGENCY MEDICINE

## 2022-12-20 PROCEDURE — 99283 EMERGENCY DEPT VISIT LOW MDM: CPT

## 2022-12-20 PROCEDURE — 36415 COLL VENOUS BLD VENIPUNCTURE: CPT

## 2022-12-20 PROCEDURE — 80048 BASIC METABOLIC PNL TOTAL CA: CPT | Performed by: EMERGENCY MEDICINE

## 2022-12-20 PROCEDURE — 87491 CHLMYD TRACH DNA AMP PROBE: CPT | Performed by: EMERGENCY MEDICINE

## 2022-12-20 NOTE — ED INITIAL ASSESSMENT (HPI)
Pt presents to the Er with c/o fatigue ad feeling \"off\" Pt states he was here for the same issue yesterday and diagnosed with strep.  States he has been taking PO ABT however is not helping

## 2022-12-21 LAB
C TRACH DNA SPEC QL NAA+PROBE: NEGATIVE
N GONORRHOEA DNA SPEC QL NAA+PROBE: NEGATIVE

## 2022-12-23 ENCOUNTER — TELEPHONE (OUTPATIENT)
Dept: FAMILY MEDICINE CLINIC | Facility: CLINIC | Age: 22
End: 2022-12-23

## 2022-12-23 RX ORDER — DESVENLAFAXINE 50 MG/1
50 TABLET, EXTENDED RELEASE ORAL DAILY
Qty: 90 TABLET | Refills: 1 | Status: SHIPPED | OUTPATIENT
Start: 2022-12-23

## 2022-12-23 NOTE — TELEPHONE ENCOUNTER
Patient needs medications sent to [facility] mail order pharmacy  . Patient states wrong dose of Gabapentin was sent to [facility]. Patient already picked up medication from [place] and it was for Gabapentin 100 mg once a day. Patient states he takes Gabapentin 300 mg twice a day. Upon chart review, patient has to follow up with doctor for medication to be prescribed. No record of  prescribing Gabapentin 300 mg twice a day. Patient advised to schedule an appointment and agrees. Appointment 1/3/22 with Dr. Al Graf 50 mg was sent to wrong mail order pharmacy. Medication now sent to [facility] pharmacy.

## 2022-12-23 NOTE — TELEPHONE ENCOUNTER
Patient called stating medication was not received at the pharmacy. desvenlafaxine ER 50 MG Oral Tablet 24 Hr & gabapentin 300 MG Oral Cap. Patient is out medication. Would like it resent to [facility].

## 2023-01-03 ENCOUNTER — LAB ENCOUNTER (OUTPATIENT)
Dept: LAB | Age: 23
End: 2023-01-03
Attending: FAMILY MEDICINE
Payer: COMMERCIAL

## 2023-01-03 ENCOUNTER — OFFICE VISIT (OUTPATIENT)
Dept: FAMILY MEDICINE CLINIC | Facility: CLINIC | Age: 23
End: 2023-01-03
Payer: COMMERCIAL

## 2023-01-03 VITALS
OXYGEN SATURATION: 96 % | HEIGHT: 69 IN | DIASTOLIC BLOOD PRESSURE: 80 MMHG | BODY MASS INDEX: 46.65 KG/M2 | SYSTOLIC BLOOD PRESSURE: 120 MMHG | HEART RATE: 92 BPM | WEIGHT: 315 LBS

## 2023-01-03 DIAGNOSIS — F41.1 GAD (GENERALIZED ANXIETY DISORDER): ICD-10-CM

## 2023-01-03 DIAGNOSIS — Z11.3 SCREENING EXAMINATION FOR VENEREAL DISEASE: Primary | ICD-10-CM

## 2023-01-03 DIAGNOSIS — Z11.3 ROUTINE SCREENING FOR STI (SEXUALLY TRANSMITTED INFECTION): Primary | ICD-10-CM

## 2023-01-03 PROCEDURE — 87491 CHLMYD TRACH DNA AMP PROBE: CPT

## 2023-01-03 PROCEDURE — 86780 TREPONEMA PALLIDUM: CPT | Performed by: FAMILY MEDICINE

## 2023-01-03 PROCEDURE — 3074F SYST BP LT 130 MM HG: CPT | Performed by: FAMILY MEDICINE

## 2023-01-03 PROCEDURE — 3079F DIAST BP 80-89 MM HG: CPT | Performed by: FAMILY MEDICINE

## 2023-01-03 PROCEDURE — 36415 COLL VENOUS BLD VENIPUNCTURE: CPT | Performed by: FAMILY MEDICINE

## 2023-01-03 PROCEDURE — 3008F BODY MASS INDEX DOCD: CPT | Performed by: FAMILY MEDICINE

## 2023-01-03 PROCEDURE — 99213 OFFICE O/P EST LOW 20 MIN: CPT | Performed by: FAMILY MEDICINE

## 2023-01-03 PROCEDURE — 87591 N.GONORRHOEAE DNA AMP PROB: CPT

## 2023-01-03 PROCEDURE — 87389 HIV-1 AG W/HIV-1&-2 AB AG IA: CPT | Performed by: FAMILY MEDICINE

## 2023-01-04 LAB
C TRACH DNA SPEC QL NAA+PROBE: NEGATIVE
N GONORRHOEA DNA SPEC QL NAA+PROBE: NEGATIVE
T PALLIDUM AB SER QL: NEGATIVE

## 2023-03-07 ENCOUNTER — OFFICE VISIT (OUTPATIENT)
Dept: FAMILY MEDICINE CLINIC | Facility: CLINIC | Age: 23
End: 2023-03-07

## 2023-03-07 ENCOUNTER — LAB ENCOUNTER (OUTPATIENT)
Dept: LAB | Age: 23
End: 2023-03-07
Payer: COMMERCIAL

## 2023-03-07 VITALS
HEART RATE: 90 BPM | WEIGHT: 315 LBS | HEIGHT: 69 IN | OXYGEN SATURATION: 96 % | BODY MASS INDEX: 46.65 KG/M2 | TEMPERATURE: 99 F | RESPIRATION RATE: 20 BRPM | SYSTOLIC BLOOD PRESSURE: 131 MMHG | DIASTOLIC BLOOD PRESSURE: 82 MMHG

## 2023-03-07 DIAGNOSIS — E66.01 CLASS 3 SEVERE OBESITY WITHOUT SERIOUS COMORBIDITY WITH BODY MASS INDEX (BMI) OF 50.0 TO 59.9 IN ADULT, UNSPECIFIED OBESITY TYPE (HCC): ICD-10-CM

## 2023-03-07 DIAGNOSIS — F41.8 DEPRESSION WITH ANXIETY: ICD-10-CM

## 2023-03-07 DIAGNOSIS — Z00.00 ROUTINE PHYSICAL EXAMINATION: Primary | ICD-10-CM

## 2023-03-07 LAB
EST. AVERAGE GLUCOSE BLD GHB EST-MCNC: 103 MG/DL (ref 68–126)
HBA1C MFR BLD: 5.2 % (ref ?–5.7)

## 2023-03-07 PROCEDURE — 99395 PREV VISIT EST AGE 18-39: CPT

## 2023-03-07 PROCEDURE — 3075F SYST BP GE 130 - 139MM HG: CPT

## 2023-03-07 PROCEDURE — 83036 HEMOGLOBIN GLYCOSYLATED A1C: CPT

## 2023-03-07 PROCEDURE — 3008F BODY MASS INDEX DOCD: CPT

## 2023-03-07 PROCEDURE — 36415 COLL VENOUS BLD VENIPUNCTURE: CPT

## 2023-03-07 PROCEDURE — 3079F DIAST BP 80-89 MM HG: CPT

## 2023-03-07 RX ORDER — HYDROXYZINE 50 MG/1
50 TABLET, FILM COATED ORAL NIGHTLY PRN
Qty: 15 TABLET | Refills: 0 | Status: SHIPPED | OUTPATIENT
Start: 2023-03-07

## 2023-03-07 RX ORDER — VENLAFAXINE HYDROCHLORIDE 37.5 MG/1
37.5 CAPSULE, EXTENDED RELEASE ORAL DAILY
Qty: 30 CAPSULE | Refills: 0 | Status: SHIPPED | OUTPATIENT
Start: 2023-03-07

## 2023-03-10 ENCOUNTER — TELEPHONE (OUTPATIENT)
Dept: FAMILY MEDICINE CLINIC | Facility: CLINIC | Age: 23
End: 2023-03-10

## 2023-03-10 NOTE — TELEPHONE ENCOUNTER
Patient calling ( identified name and  )  Last office visit 3/7/2023    Started 2 new medications  on 3/7   ( Hydroxyzine and Venlafaxine )     , feels Hydroxyzine is making him too drowsy  during the day       Asking for a medication he can take for the anxiety during the day that will not make him sleepy      Allergies reviewed and pharmacy confirmed    Please advise and thank you.         Best call back number: Moon Lim  at [phone]

## 2023-03-10 NOTE — TELEPHONE ENCOUNTER
Advise patient that all anxiety medications will make him sleepy during the day. Can take 1/2 tablet of hydroxyzine to see if that reduces his tiredness throughout day     Venlafaxine is also good for anxiety, may take up to 2 weeks to notice a difference but it is a good medication for anxiety. Can just take venlafaxine by itself without hydroxyzine.

## 2023-03-21 PROBLEM — F31.4 SEVERE DEPRESSED BIPOLAR I DISORDER WITHOUT PSYCHOTIC FEATURES (HCC): Status: ACTIVE | Noted: 2023-03-21

## 2023-03-21 PROBLEM — R45.851 SUICIDAL IDEATION: Status: ACTIVE | Noted: 2023-03-21

## 2023-03-21 PROBLEM — F41.1 GENERALIZED ANXIETY DISORDER: Status: ACTIVE | Noted: 2021-08-01

## 2023-04-21 ENCOUNTER — LAB ENCOUNTER (OUTPATIENT)
Dept: LAB | Age: 23
End: 2023-04-21
Payer: COMMERCIAL

## 2023-04-21 DIAGNOSIS — Z11.3 SCREEN FOR STD (SEXUALLY TRANSMITTED DISEASE): ICD-10-CM

## 2023-04-21 DIAGNOSIS — R30.0 DYSURIA: ICD-10-CM

## 2023-04-21 PROBLEM — F41.1 GAD (GENERALIZED ANXIETY DISORDER): Status: ACTIVE | Noted: 2023-04-21

## 2023-04-21 PROBLEM — F32.9 MAJOR DEPRESSIVE DISORDER: Status: ACTIVE | Noted: 2023-04-21

## 2023-04-21 LAB
BILIRUB UR QL: NEGATIVE
COLOR UR: YELLOW
GLUCOSE UR-MCNC: NEGATIVE MG/DL
HAV IGM SER QL: NONREACTIVE
HBV CORE IGM SER QL: NONREACTIVE
HBV SURFACE AG SERPL QL IA: NONREACTIVE
HCV AB SERPL QL IA: NONREACTIVE
HGB UR QL STRIP.AUTO: NEGATIVE
KETONES UR-MCNC: NEGATIVE MG/DL
LEUKOCYTE ESTERASE UR QL STRIP.AUTO: NEGATIVE
NITRITE UR QL STRIP.AUTO: NEGATIVE
PH UR: 5.5 [PH] (ref 5–8)
PROT UR-MCNC: NEGATIVE MG/DL
SP GR UR REFRACTOMETRY: 1.02 (ref 1–1.03)
SP GR UR STRIP: 1.02 (ref 1–1.03)
UROBILINOGEN UR STRIP-ACNC: 0.2

## 2023-04-21 PROCEDURE — 86780 TREPONEMA PALLIDUM: CPT

## 2023-04-21 PROCEDURE — 87389 HIV-1 AG W/HIV-1&-2 AB AG IA: CPT

## 2023-04-21 PROCEDURE — 87086 URINE CULTURE/COLONY COUNT: CPT

## 2023-04-21 PROCEDURE — 81001 URINALYSIS AUTO W/SCOPE: CPT

## 2023-04-21 PROCEDURE — 87491 CHLMYD TRACH DNA AMP PROBE: CPT

## 2023-04-21 PROCEDURE — 36415 COLL VENOUS BLD VENIPUNCTURE: CPT

## 2023-04-21 PROCEDURE — 80074 ACUTE HEPATITIS PANEL: CPT

## 2023-04-21 PROCEDURE — 87591 N.GONORRHOEAE DNA AMP PROB: CPT

## 2023-04-23 LAB
C TRACH DNA SPEC QL NAA+PROBE: NEGATIVE
N GONORRHOEA DNA SPEC QL NAA+PROBE: NEGATIVE

## 2023-04-24 LAB — T PALLIDUM AB SER QL: NEGATIVE

## 2023-04-27 NOTE — TELEPHONE ENCOUNTER
Patient called to refill medication, did not come through [facility] and was told to call office if he did not receive it     venlafaxine ER 75 MG Oral Capsule SR 24 Hr    traZODone 50 MG Oral Tab

## 2023-04-28 RX ORDER — TRAZODONE HYDROCHLORIDE 50 MG/1
50 TABLET ORAL NIGHTLY PRN
Qty: 30 TABLET | Refills: 1 | Status: SHIPPED | OUTPATIENT
Start: 2023-04-28 | End: 2023-04-28

## 2023-04-28 RX ORDER — VENLAFAXINE HYDROCHLORIDE 75 MG/1
75 CAPSULE, EXTENDED RELEASE ORAL NIGHTLY
Qty: 30 CAPSULE | Refills: 0 | Status: SHIPPED | OUTPATIENT
Start: 2023-04-28

## 2023-04-28 RX ORDER — VENLAFAXINE HYDROCHLORIDE 75 MG/1
75 CAPSULE, EXTENDED RELEASE ORAL NIGHTLY
Qty: 30 CAPSULE | Refills: 1 | Status: SHIPPED | OUTPATIENT
Start: 2023-04-28 | End: 2023-04-28

## 2023-04-28 RX ORDER — TRAZODONE HYDROCHLORIDE 50 MG/1
50 TABLET ORAL NIGHTLY PRN
Qty: 30 TABLET | Refills: 0 | Status: SHIPPED | OUTPATIENT
Start: 2023-04-28

## 2023-04-28 NOTE — TELEPHONE ENCOUNTER
The patient was informed. He sated he  needs a short term supply to go to [place]. He will call [facility] and cancel the once that were sent. I pended a 30 day to [facility]. I cannot sent due to interaction alert.

## 2023-05-04 PROBLEM — R30.0 DYSURIA: Status: RESOLVED | Noted: 2023-04-21 | Resolved: 2023-05-04

## 2023-05-04 PROBLEM — E78.5 DYSLIPIDEMIA (HIGH LDL; LOW HDL): Status: ACTIVE | Noted: 2023-05-04

## 2023-05-04 PROBLEM — Z00.00 ROUTINE PHYSICAL EXAMINATION: Status: RESOLVED | Noted: 2023-03-07 | Resolved: 2023-05-04

## 2023-05-04 PROBLEM — E66.1 CLASS 3 DRUG-INDUCED OBESITY WITH SERIOUS COMORBIDITY AND BODY MASS INDEX (BMI) OF 50.0 TO 59.9 IN ADULT (HCC): Status: RESOLVED | Noted: 2023-05-04 | Resolved: 2023-05-04

## 2023-05-04 PROBLEM — E66.1 CLASS 3 DRUG-INDUCED OBESITY WITH SERIOUS COMORBIDITY AND BODY MASS INDEX (BMI) OF 50.0 TO 59.9 IN ADULT (HCC): Status: ACTIVE | Noted: 2023-05-04

## 2023-05-04 PROBLEM — F50.81 BINGE EATING DISORDER: Status: ACTIVE | Noted: 2023-05-04

## 2023-05-05 ENCOUNTER — TELEPHONE (OUTPATIENT)
Dept: FAMILY MEDICINE CLINIC | Facility: CLINIC | Age: 23
End: 2023-05-05

## 2023-05-05 NOTE — TELEPHONE ENCOUNTER
Per pharmacy on file rx med lisdexamfetamine [facility]) is not covered by patient insurance, pharmacy says that it's up to the doctor if she would like to do a prior Authorization or do a replacement. Yes

## 2023-05-06 NOTE — TELEPHONE ENCOUNTER
We can do the PA but need the form if the pharmacy can send to us, or have pt have insurance send it to us.

## 2023-05-08 ENCOUNTER — TELEPHONE (OUTPATIENT)
Dept: FAMILY MEDICINE CLINIC | Facility: CLINIC | Age: 23
End: 2023-05-08

## 2023-05-09 NOTE — PROGRESS NOTES
HPI:    Patient ID: Shamar Lott is a 24year old male. This patient is a 23-year-old -American male who returns to the clinic still symptomatic and very concerned about the \"fogginess\" that he experiences after every orgasm.   His vision is with clinical psychologist.    Return in about 3 months (around 1/22/2021), or if symptoms worsen or fail to improve.          QL#[id number] Aklief counseling:  Patient advised to apply a pea-sized amount only at bedtime and wait 30 minutes after washing their face before applying.  If too drying, patient may add a non-comedogenic moisturizer.  The most commonly reported side effects including irritation, redness, scaling, dryness, stinging, burning, itching, and increased risk of sunburn.  The patient verbalized understanding of the proper use and possible adverse effects of retinoids.  All of the patient's questions and concerns were addressed.

## 2023-05-12 RX ORDER — ARIPIPRAZOLE 2 MG/1
2 TABLET ORAL DAILY
Qty: 30 TABLET | Refills: 11 | OUTPATIENT
Start: 2023-05-12

## 2023-05-12 RX ORDER — TRAZODONE HYDROCHLORIDE 50 MG/1
TABLET ORAL
Qty: 30 TABLET | Refills: 11 | OUTPATIENT
Start: 2023-05-12

## 2023-05-12 RX ORDER — VENLAFAXINE HYDROCHLORIDE 75 MG/1
CAPSULE, EXTENDED RELEASE ORAL
Qty: 30 CAPSULE | Refills: 11 | OUTPATIENT
Start: 2023-05-12

## 2023-05-12 NOTE — TELEPHONE ENCOUNTER
Please review; Protocol Failed/ no protocol. The original prescription was discontinued on 5/4/2023 by Dianelys Lee DO for the following reason: Therapy completed. Renewing this prescription may not be appropriate.     Rx Pended, authorize if appropriate      Requested Prescriptions   Pending Prescriptions Disp Refills    ARIPIPRAZOLE 2 MG Oral Tab [Pharmacy Med Name: ARIPIPRAZOLE  2MG  TAB] 30 tablet 11     Sig: TAKE 1 TABLET BY MOUTH DAILY       There is no refill protocol information for this order      Refused Prescriptions Disp Refills    VENLAFAXINE ER 75 MG Oral Capsule SR 24 Hr [Pharmacy Med Name: Venlafaxine HCl ER 75 MG Oral Capsule Extended Release 24 Hour] 30 capsule 11     Sig: TAKE 1 CAPSULE BY MOUTH AT  BEDTIME       Psychiatric Non-Scheduled (Anti-Anxiety) Passed - 5/11/2023 10:47 AM        Passed - In person appointment or virtual visit in the past 6 mos or appointment in next 3 mos     Recent Outpatient Visits              1 week ago Binge eating disorder    [facility], [address] [place] [address] James Mcginnis DO    Office Visit    3 weeks ago Major depressive disorder, remission status unspecified, unspecified whether recurrent    [facility], [address] [facility] SCOTTIE Clark    Office Visit    2 months ago Routine physical examination    [address], [address] [facility] SCOTTIE Clark    Office Visit    4 months ago Routine screening for STI (sexually transmitted infection)    Gali Ibrahim MD    Office Visit    4 months ago Depression with anxiety    [facility], [address], [place] Rosemary Ortiz PA-C    Office Visit          Future Appointments         Provider Department Appt Notes    In 3 weeks James Mcginnis DO [facility], [address] [facility] 1 month                 TRAZODONE 48 MG Oral Tab [Pharmacy Med Name: traZODone HCl 50 MG Oral Tablet] 30 tablet 11     Sig: TAKE 1 TABLET BY MOUTH AT NIGHT  AS NEEDED FOR SLEEP , INSOMNIA.        There is no refill protocol information for this order

## 2023-05-22 NOTE — TELEPHONE ENCOUNTER
Please review; no protocol  Medication pended for your review and approval.     Requested Prescriptions   Pending Prescriptions Disp Refills    HYDROXYZINE 50 MG Oral Tab [Pharmacy Med Name: HYDROXYZINE HYDROCHLORIDE  50MG  TAB] 60 tablet 0     Sig: TAKE 1 TABLET BY MOUTH TWICE  DAILY AS NEEDED FOR ANXIETY ,  INSOMNIA.        There is no refill protocol information for this order       LORAZEPAM 1 MG Oral Tab [Pharmacy Med Name: LORazepam 1 MG Oral Tablet] 30 tablet 0     Sig: TAKE 1 TABLET BY MOUTH TWICE  DAILY AS NEEDED FOR ANXIETY       There is no refill protocol information for this order

## 2023-05-22 NOTE — TELEPHONE ENCOUNTER
Patient was suppose to establish care with psychiatrist. Jose Rivas if patient was able to make appointment with psychiatrist.

## 2023-05-24 NOTE — TELEPHONE ENCOUNTER
Patient forgot to schedule an appointment with psychiatrist. He will call today to try to establish care. In the mean time he is asking for a 1 month supply until his appointment.  Medication pended for your review and approval.

## 2023-05-24 NOTE — TELEPHONE ENCOUNTER
Please review. Protocol failed or has no protocol. Requested Prescriptions   Pending Prescriptions Disp Refills    HYDROXYZINE 50 MG Oral Tab [Pharmacy Med Name: HYDROXYZINE HYDROCHLORIDE  50MG  TAB] 60 tablet 0     Sig: TAKE 1 TABLET BY MOUTH TWICE  DAILY AS NEEDED FOR ANXIETY ,  INSOMNIA.        There is no refill protocol information for this order       LORAZEPAM 1 MG Oral Tab [Pharmacy Med Name: LORazepam 1 MG Oral Tablet] 30 tablet 0     Sig: TAKE 1 TABLET BY MOUTH TWICE  DAILY AS NEEDED FOR ANXIETY       There is no refill protocol information for this order          Recent Outpatient Visits              2 weeks ago Binge eating disorder    [facility], [address] [place], [place] [address]clay LatahJames Moreno DO    Office Visit    1 month ago Major depressive disorder, remission status unspecified, unspecified whether recurrent    [facility], [facility] Richie Archibaldpahoclay [place]h [facility] Junes, APRN    Office Visit    2 months ago Routine physical examination    [address], [address] [place] Junes, APRN    Office Visit    4 months ago Routine screening for STI (sexually transmitted infection)    Tucker Zepeda MD    Office Visit    5 months ago Depression with anxiety    [address], [address], [place] Theda Pilot, [place]    Office Visit            Future Appointments         Provider Department Appt Notes    In 1 week James Mcginnis DO [address], [address] [place] 1 month 98

## 2023-05-24 NOTE — TELEPHONE ENCOUNTER
Patient requesting refills of the 2 medications, hydrOXYzine 50 MG Oral Tab, and  LORazepam 1 MG Oral Tab   Through our office, he states that he attempted to make an appointment with a psychiatrist, but had to leave a voice mail, and is awaiting call back, please advise.

## 2023-05-25 RX ORDER — LORAZEPAM 1 MG/1
TABLET ORAL
Qty: 30 TABLET | Refills: 0 | Status: SHIPPED | OUTPATIENT
Start: 2023-05-25

## 2023-05-25 RX ORDER — HYDROXYZINE 50 MG/1
TABLET, FILM COATED ORAL
Qty: 60 TABLET | Refills: 0 | Status: SHIPPED | OUTPATIENT
Start: 2023-05-25

## 2023-06-16 RX ORDER — ARIPIPRAZOLE 2 MG/1
TABLET ORAL
Qty: 30 TABLET | Refills: 11 | OUTPATIENT
Start: 2023-06-16

## 2023-06-16 RX ORDER — VENLAFAXINE HYDROCHLORIDE 75 MG/1
CAPSULE, EXTENDED RELEASE ORAL
Qty: 30 CAPSULE | Refills: 11 | OUTPATIENT
Start: 2023-06-16

## 2023-06-16 NOTE — TELEPHONE ENCOUNTER
Venlafaxine was refilled today. Patient no longer takes aripiprazole (verified with patient), please see interaction warning for trazodone and advise on refill. Requested Prescriptions     Pending Prescriptions Disp Refills    VENLAFAXINE ER 75 MG Oral Capsule SR 24 Hr [Pharmacy Med Name: Venlafaxine HCl ER 75 MG Oral Capsule Extended Release 24 Hour] 30 capsule 11     Sig: TAKE 1 CAPSULE BY MOUTH AT  BEDTIME    TRAZODONE 50 MG Oral Tab [Pharmacy Med Name: traZODone HCl 50 MG Oral Tablet] 30 tablet 11     Sig: TAKE 1 TABLET BY MOUTH AT NIGHT  AS NEEDED FOR SLEEP , INSOMNIA. ARIPIPRAZOLE 2 MG Oral Tab [Pharmacy Med Name: ARIPIPRAZOLE  2MG  TAB] 30 tablet 11     Sig: TAKE 1 TABLET BY MOUTH DAILY      Recent Visits  Date Type Provider Dept   05/04/23 Office Visit My Santos DO [facility]   04/21/23 Office Visit SCOTTIE Parsons [facility]   03/07/23 Office Visit SCOTTIE Parsons [facility]   01/03/23 Office Visit Yandy Cordero MD [facility]   12/07/22 Office Visit Bailey Garner MD [facility]   04/27/22 Office Visit Willy Garland MD [facility]   03/18/22 Office Visit Meme Montejo MD [facility]   Showing recent visits within past 540 days with a meds authorizing provider and meeting all other requirements  Future Appointments  No visits were found meeting these conditions.   Showing future appointments within next 150 days with a meds authorizing provider and meeting all other requirements     Requested Prescriptions   Pending Prescriptions Disp Refills    VENLAFAXINE ER 75 MG Oral Capsule SR 24 Hr [Pharmacy Med Name: Venlafaxine HCl ER 75 MG Oral Capsule Extended Release 24 Hour] 30 capsule 11     Sig: TAKE 1 CAPSULE BY MOUTH AT  BEDTIME       Psychiatric Non-Scheduled (Anti-Anxiety) Passed - 6/16/2023  6:41 AM        Passed - In person appointment or virtual visit in the past 6 mos or appointment in next 3 mos     Recent Outpatient Visits              1 month ago Binge eating disorder    [facility], Trisha Newman, [place]    Office Visit    1 month ago Major depressive disorder, remission status unspecified, unspecified whether recurrent    [facility], Trisha Newman, [facility], APRN    Office Visit    3 months ago Routine physical examination    Kavya Hinton [facility], APRN    Office Visit    5 months ago Routine screening for STI (sexually transmitted infection)    Jeri Altamirano MD    Office Visit    5 months ago Depression with anxiety    [facility], [address], [place] Rose MARILU Cruz    Office Visit                        TRAZODONE 50 MG Oral Tab [place] Med Name: traZODone HCl 50 MG Oral Tablet] 30 tablet 11     Sig: TAKE 1 TABLET BY MOUTH AT NIGHT  AS NEEDED FOR SLEEP , INSOMNIA.        There is no refill protocol information for this order       ARIPIPRAZOLE 2 MG Oral Tab [Pharmacy Med Name: ARIPIPRAZOLE  2MG  TAB] 30 tablet 11     Sig: TAKE 1 TABLET BY MOUTH DAILY       There is no refill protocol information for this order            Recent Outpatient Visits              1 month ago Binge eating disorder    [facility], [place] Gomez Fitzgerald, DO    Office Visit    1 month ago Major depressive disorder, remission status unspecified, unspecified whether recurrent    [facility], Trisha Newman, [facility], APRN    Office Visit    3 months ago Routine physical examination    Kavya Hinton [facility], APRN    Office Visit    5 months ago Routine screening for STI (sexually transmitted infection)    Ivan Garcia MD    Office Visit    5 months ago Depression with anxiety    [facility], [address], [place] Darcus Fearing, [facility]    Office Visit

## 2023-06-17 RX ORDER — VENLAFAXINE HYDROCHLORIDE 75 MG/1
CAPSULE, EXTENDED RELEASE ORAL
Qty: 30 CAPSULE | Refills: 11 | OUTPATIENT
Start: 2023-06-17

## 2023-06-17 RX ORDER — ARIPIPRAZOLE 2 MG/1
TABLET ORAL
Qty: 30 TABLET | Refills: 11 | OUTPATIENT
Start: 2023-06-17

## 2023-06-17 NOTE — TELEPHONE ENCOUNTER
Duplicate request, previously addressed.     90 day e-rx sent 6/16/23 of venlafaxine    See alternative encounter, patient no longer takes aripiprazole

## 2023-06-19 RX ORDER — TRAZODONE HYDROCHLORIDE 50 MG/1
50 TABLET ORAL NIGHTLY
Qty: 30 TABLET | Refills: 0 | Status: SHIPPED | OUTPATIENT
Start: 2023-06-19

## 2023-06-19 NOTE — TELEPHONE ENCOUNTER
Refill sent to pharmacy  Inquire if patient was able to schedule with psychiatrist for further refills     Advise patient of risk of serotonin syndrome when combining trazodone and venlafaxine which include anxiety, restlessness, headache, nausea, vomiting, fast heart rate, fast breathing, and increased sweating.      Encourage patient to only take trazodone as needed

## 2023-06-21 NOTE — TELEPHONE ENCOUNTER
Spoke with pt,  verified, pt is returning our call. Pt was informed of [address] [place] [address] recommendation, pt stated understanding. He still in the process of scheduling with psyche and hoping end of this week he can get schedule.          PAUL

## 2023-06-28 ENCOUNTER — LAB ENCOUNTER (OUTPATIENT)
Dept: LAB | Age: 23
End: 2023-06-28
Attending: FAMILY MEDICINE
Payer: COMMERCIAL

## 2023-06-28 ENCOUNTER — OFFICE VISIT (OUTPATIENT)
Dept: FAMILY MEDICINE CLINIC | Facility: CLINIC | Age: 23
End: 2023-06-28

## 2023-06-28 VITALS
WEIGHT: 315 LBS | BODY MASS INDEX: 46.65 KG/M2 | SYSTOLIC BLOOD PRESSURE: 104 MMHG | HEART RATE: 103 BPM | HEIGHT: 69 IN | DIASTOLIC BLOOD PRESSURE: 71 MMHG

## 2023-06-28 DIAGNOSIS — R94.31 ABNORMAL EKG: ICD-10-CM

## 2023-06-28 DIAGNOSIS — F41.8 DEPRESSION WITH ANXIETY: ICD-10-CM

## 2023-06-28 DIAGNOSIS — R94.31 ABNORMAL EKG: Primary | ICD-10-CM

## 2023-06-28 DIAGNOSIS — E66.01 CLASS 3 SEVERE OBESITY DUE TO EXCESS CALORIES WITHOUT SERIOUS COMORBIDITY WITH BODY MASS INDEX (BMI) OF 50.0 TO 59.9 IN ADULT (HCC): ICD-10-CM

## 2023-06-28 PROCEDURE — 3074F SYST BP LT 130 MM HG: CPT | Performed by: FAMILY MEDICINE

## 2023-06-28 PROCEDURE — 93010 ELECTROCARDIOGRAM REPORT: CPT | Performed by: INTERNAL MEDICINE

## 2023-06-28 PROCEDURE — 99214 OFFICE O/P EST MOD 30 MIN: CPT | Performed by: FAMILY MEDICINE

## 2023-06-28 PROCEDURE — 3078F DIAST BP <80 MM HG: CPT | Performed by: FAMILY MEDICINE

## 2023-06-28 PROCEDURE — 93005 ELECTROCARDIOGRAM TRACING: CPT

## 2023-06-28 PROCEDURE — 3008F BODY MASS INDEX DOCD: CPT | Performed by: FAMILY MEDICINE

## 2023-06-28 RX ORDER — ARIPIPRAZOLE 2 MG/1
TABLET ORAL
COMMUNITY
Start: 2023-05-19 | End: 2023-06-28

## 2023-06-28 RX ORDER — METFORMIN HYDROCHLORIDE 500 MG/1
500 TABLET, EXTENDED RELEASE ORAL
Qty: 90 TABLET | Refills: 3 | Status: SHIPPED | OUTPATIENT
Start: 2023-06-28

## 2023-06-29 LAB
ATRIAL RATE: 74 BPM
P AXIS: 49 DEGREES
P-R INTERVAL: 128 MS
Q-T INTERVAL: 374 MS
QRS DURATION: 100 MS
QTC CALCULATION (BEZET): 415 MS
R AXIS: 40 DEGREES
T AXIS: 40 DEGREES
VENTRICULAR RATE: 74 BPM

## 2023-08-07 NOTE — TELEPHONE ENCOUNTER
Dear Santino Romero Staff can you please call patient to verify if they are seeing their psychiatrist for medication refill. [employer] message was sent as well to verify,  see note below thank you. Assessment  ASSESSMENT/PLAN:   1. Major depressive disorder, remission status unspecified, unspecified whether recurrent  - Medication refills given until patient established with psychiatrist.   - Advised patient to call office if further refills needed before appointment with psychiatry  - Go to ER for SI/HI.  Patient agreed   - OP REFERRAL TO [facility]

## 2023-08-08 RX ORDER — ARIPIPRAZOLE 2 MG/1
2 TABLET ORAL DAILY
Qty: 30 TABLET | Refills: 11 | OUTPATIENT
Start: 2023-08-08

## 2023-08-08 NOTE — TELEPHONE ENCOUNTER
Cristian De Leons pt needs a refill on his HYDROXYZINE 50 MG Oral Tab pt not able to find a psychiatrist. Please advise

## 2023-08-09 RX ORDER — HYDROXYZINE 50 MG/1
50 TABLET, FILM COATED ORAL 2 TIMES DAILY PRN
Qty: 30 TABLET | Refills: 0 | Status: SHIPPED | OUTPATIENT
Start: 2023-08-09

## 2023-08-11 RX ORDER — ARIPIPRAZOLE 2 MG/1
2 TABLET ORAL DAILY
Qty: 30 TABLET | Refills: 11 | OUTPATIENT
Start: 2023-08-11

## 2023-08-12 NOTE — TELEPHONE ENCOUNTER
Refill not appropriate. \"The original prescription was discontinued on 5/4/2023 by Ny Xiao DO for the following reason: Therapy completed. Renewing this prescription may not be appropriate. \"  Requested Prescriptions   Pending Prescriptions Disp Refills    ARIPIPRAZOLE 2 MG Oral Tab [Pharmacy Med Name: ARIPIPRAZOLE  2MG  TAB] 30 tablet 11     Sig: TAKE 1 TABLET BY MOUTH DAILY       There is no refill protocol information for this order

## 2023-09-07 RX ORDER — HYDROXYZINE 50 MG/1
50 TABLET, FILM COATED ORAL 2 TIMES DAILY PRN
Qty: 30 TABLET | Refills: 0 | Status: SHIPPED | OUTPATIENT
Start: 2023-09-07

## 2023-09-07 NOTE — TELEPHONE ENCOUNTER
Please review. Protocol failed / No protocol.     Requested Prescriptions   Pending Prescriptions Disp Refills    HYDROXYZINE 50 MG Oral Tab [Pharmacy Med Name: HYDROXYZINE HYDROCHLORIDE  50MG  TAB] 30 tablet 0     Sig: TAKE 1 TABLET BY MOUTH TWICE  DAILY AS NEEDED FOR ANXIETY AND  INSOMNIA       There is no refill protocol information for this order              Recent Outpatient Visits              2 months ago Abnormal EKG    [facility] [address] [place] [address]    Office Visit    4 months ago Binge eating disorder    [facility], [address] [place], [place] [address]Mary Moreno,     Office Visit    4 months ago Major depressive disorder, remission status unspecified, unspecified whether recurrent    [facility], [address] [place], [place] [address]SCOTTIE Isaacs    Office Visit    6 months ago Routine physical examination    [facility] [place] SCOTT[place] Castañeda    Office Visit    8 months ago Routine screening for STI (sexually transmitted infection)    [facility]    [facility]

## 2023-09-26 RX ORDER — HYDROXYZINE 50 MG/1
50 TABLET, FILM COATED ORAL 2 TIMES DAILY PRN
Qty: 30 TABLET | Refills: 0 | Status: SHIPPED | OUTPATIENT
Start: 2023-09-26

## 2023-09-26 RX ORDER — VENLAFAXINE HYDROCHLORIDE 75 MG/1
75 CAPSULE, EXTENDED RELEASE ORAL NIGHTLY
Qty: 90 CAPSULE | Refills: 1 | Status: SHIPPED | OUTPATIENT
Start: 2023-09-26

## 2023-09-26 RX ORDER — ARIPIPRAZOLE 2 MG/1
2 TABLET ORAL DAILY
Qty: 30 TABLET | Refills: 11 | OUTPATIENT
Start: 2023-09-26

## 2023-09-26 NOTE — TELEPHONE ENCOUNTER
Refill passed per [facility] protocol. Please review pended refill request as unable to refill due to high/very high drug interaction warning copied here:    High  Drug-Drug: traZODone and venlafaxine ERSerotonergic effects of trazodone and serotonin/norepinephrine reuptake inhibitors (SNRIs) may be additive. The risk of serotonin syndrome/toxicity may be increased.     Requested Prescriptions   Pending Prescriptions Disp Refills       VENLAFAXINE ER 75 MG Oral Capsule SR 24 Hr [Pharmacy Med Name: Venlafaxine HCl ER 75 MG Oral Capsule Extended Release 24 Hour] 30 capsule 11     Sig: TAKE 1 CAPSULE BY MOUTH AT  BEDTIME       Psychiatric Non-Scheduled (Anti-Anxiety) Passed - 9/25/2023  9:41 PM        Passed - In person appointment or virtual visit in the past 6 mos or appointment in next 3 mos     Recent Outpatient Visits              3 months ago Abnormal EKG    Estefany Grajeda, [address], [place]    Office Visit    4 months ago [facility] Kerman Gruber[facility]    Office Visit    5 months ago Major depressive disorder, remission status unspecified, unspecified whether recurrent    [facility], Johnson Camacho APRN    Office Visit    6 months ago Routine physical examination    Johnson Castillo APRN    Office Visit    8 months ago Routine screening for STI (sexually transmitted infection)    Leonor Benavides MD    Office Visit                           Recent Outpatient Visits              3 months ago Abnormal EKG    Estefany Grajeda, [address]  [address], [place] [facility] [place] [facility]    Office Visit    4 months ago Binge eating disorder    [facility] [address] Kerman Gruber DO    Office Visit    5 months ago Major depressive disorder, remission status unspecified, unspecified whether recurrent    [facility], [facility] [place]    Office Visit    6 months ago Routine physical examination    [facility], [place] SCOTT[place] Lorenzo    Office Visit    8 months ago Routine screening for STI (sexually transmitted infection)    Sharif Foy MD    Office Visit

## 2023-09-26 NOTE — TELEPHONE ENCOUNTER
Please review refill protocol failed/ no protocol  Requested Prescriptions   Pending Prescriptions Disp Refills    HYDROXYZINE 50 MG Oral Tab [Pharmacy Med Name: HYDROXYZINE HYDROCHLORIDE  50MG  TAB] 30 tablet 0     Sig: TAKE 1 TABLET BY MOUTH TWICE  DAILY AS NEEDED FOR ANXIETY       There is no refill protocol information for this order

## 2023-10-13 RX ORDER — TRAZODONE HYDROCHLORIDE 50 MG/1
50 TABLET ORAL NIGHTLY
Qty: 90 TABLET | Refills: 3 | OUTPATIENT
Start: 2023-10-13

## 2023-10-13 NOTE — TELEPHONE ENCOUNTER
Please review; protocol failed.    Requested Prescriptions   Pending Prescriptions Disp Refills    TRAZODONE 50 MG Oral Tab [Pharmacy Med Name: traZODone HCl 50 MG Oral Tablet] 30 tablet 11     Sig: TAKE 1 TABLET BY MOUTH EVERY  NIGHT       There is no refill protocol information for this order        Recent Outpatient Visits              3 months ago Abnormal EKG    [place]    Office Visit    5 months ago Binge eating disorder    [facility], [address] [place]     Office Visit    5 months ago Major depressive disorder, remission status unspecified, unspecified whether recurrent    [facility] [address], [place]    Office Visit    7 months ago Routine physical examination    Johnosn Knox APRN    Office Visit    9 months ago Routine screening for STI (sexually transmitted infection)    Fer Knox MD    Office Visit

## 2023-11-06 NOTE — TELEPHONE ENCOUNTER
Please review. Protocol failed/ No protocol.    Requested Prescriptions   Pending Prescriptions Disp Refills    hydrOXYzine 50 MG Oral Tab [Pharmacy Med Name: HYDROXYZINE HYDROCHLORIDE  50MG  TAB] 60 tablet 2     Sig: Take 1 tablet (50 mg total) by mouth 2 (two) times daily as needed for Anxiety.       There is no refill protocol information for this order          Recent Outpatient Visits              4 months ago Abnormal EKG    [facility]    Office Visit    6 months ago Binge eating disorder    [facility]     Office Visit    6 months ago Major depressive disorder, remission status unspecified, unspecified whether recurrent    [facility]    [facility]    8 months ago Routine physical examination    [facility] [address], APRN    Office Visit    10 months ago Routine screening for STI (sexually transmitted infection)    [facility]    [facility] Visit

## 2023-11-06 NOTE — TELEPHONE ENCOUNTER
Please review. Protocol failed/ No protocol.    Requested Prescriptions   Pending Prescriptions Disp Refills    LORAZEPAM 1 MG Oral Tab [Pharmacy Med Name: LORazepam 1 MG Oral Tablet] 30 tablet 0     Sig: TAKE 1 TABLET BY MOUTH TWICE  DAILY AS NEEDED FOR ANXIETY       There is no refill protocol information for this order       ARIPIPRAZOLE 2 MG Oral Tab [Pharmacy Med Name: ARIPIPRAZOLE  2MG  TAB] 30 tablet 11     Sig: TAKE 1 TABLET BY MOUTH DAILY       There is no refill protocol information for this order          Recent Outpatient Visits              4 months ago Abnormal EKG    [facility]     Office Visit    6 months ago Binge eating disorder    [facility]    Office Visit    6 months ago Major depressive disorder, remission status unspecified, unspecified whether recurrent    [facility] [address] Jennifer, APRN    Office Visit    8 months ago Routine physical examination    [facility] [address] Jennifer, APRN    Office Visit    10 months ago Routine screening for STI (sexually transmitted infection)    [facility]    Office Visit

## 2023-11-07 RX ORDER — ARIPIPRAZOLE 2 MG/1
2 TABLET ORAL DAILY
Qty: 30 TABLET | Refills: 11 | OUTPATIENT
Start: 2023-11-07

## 2023-11-07 RX ORDER — LORAZEPAM 1 MG/1
1 TABLET ORAL 2 TIMES DAILY PRN
Qty: 30 TABLET | Refills: 0 | OUTPATIENT
Start: 2023-11-07

## 2023-11-07 RX ORDER — HYDROXYZINE 50 MG/1
50 TABLET, FILM COATED ORAL 2 TIMES DAILY PRN
Qty: 60 TABLET | Refills: 2 | OUTPATIENT
Start: 2023-11-07

## 2023-12-13 RX ORDER — HYDROXYZINE 50 MG/1
50 TABLET, FILM COATED ORAL 2 TIMES DAILY PRN
Qty: 30 TABLET | Refills: 0 | Status: SHIPPED | OUTPATIENT
Start: 2023-12-13 | End: 2024-01-10

## 2023-12-13 NOTE — TELEPHONE ENCOUNTER
Please review.  Protocol failed / Has no protocol.    Dr. Romeo is marked PCP, but hasn't seen patient since 10/2020    Recent visits with SCTOTIE Villela and Dr. Paris     Requested Prescriptions   Pending Prescriptions Disp Refills    HYDROXYZINE 50 MG Oral Tab [Pharmacy Med Name: HYDROXYZINE HYDROCHLORIDE  50MG  TAB] 30 tablet 0     Sig: TAKE 1 TABLET BY MOUTH TWICE  DAILY AS NEEDED FOR ANXIETY       There is no refill protocol information for this order           Recent Outpatient Visits              5 months ago Abnormal EKG    [facility]     Office Visit    7 months ago Binge eating disorder    [facility],     Office Visit    7 months ago Major depressive disorder, remission status unspecified, unspecified whether recurrent    [facility] [address] Jennifer, APRN    Office Visit    9 months ago Routine physical examination    [facility] [address] Jennifer, APRN    Office Visit    11 months ago Routine screening for STI (sexually transmitted infection)    [facility]    Office Visit

## 2023-12-29 ENCOUNTER — HOSPITAL ENCOUNTER (EMERGENCY)
Facility: HOSPITAL | Age: 23
Discharge: NURSING, REHAB OR LONG-TERM CARE | End: 2023-12-30
Attending: EMERGENCY MEDICINE
Payer: COMMERCIAL

## 2023-12-29 DIAGNOSIS — F22 PARANOIA (HCC): ICD-10-CM

## 2023-12-29 DIAGNOSIS — F32.A DEPRESSION, UNSPECIFIED DEPRESSION TYPE: Primary | ICD-10-CM

## 2023-12-29 LAB
AMPHET UR QL SCN: NEGATIVE
ANION GAP SERPL CALC-SCNC: 2 MMOL/L (ref 0–18)
BASOPHILS # BLD AUTO: 0.01 X10(3) UL (ref 0–0.2)
BASOPHILS NFR BLD AUTO: 0.1 %
BENZODIAZ UR QL SCN: NEGATIVE
BUN BLD-MCNC: 12 MG/DL (ref 9–23)
BUN/CREAT SERPL: 12.9 (ref 10–20)
CALCIUM BLD-MCNC: 9 MG/DL (ref 8.7–10.4)
CANNABINOIDS UR QL SCN: NEGATIVE
CHLORIDE SERPL-SCNC: 108 MMOL/L (ref 98–112)
CO2 SERPL-SCNC: 27 MMOL/L (ref 21–32)
COCAINE UR QL: NEGATIVE
CREAT BLD-MCNC: 0.93 MG/DL
CREAT UR-SCNC: 475.4 MG/DL
DEPRECATED RDW RBC AUTO: 41 FL (ref 35.1–46.3)
EGFRCR SERPLBLD CKD-EPI 2021: 119 ML/MIN/1.73M2 (ref 60–?)
EOSINOPHIL # BLD AUTO: 0.28 X10(3) UL (ref 0–0.7)
EOSINOPHIL NFR BLD AUTO: 3.4 %
ERYTHROCYTE [DISTWIDTH] IN BLOOD BY AUTOMATED COUNT: 13.6 % (ref 11–15)
ETHANOL SERPL-MCNC: <3 MG/DL (ref ?–3)
GLUCOSE BLD-MCNC: 76 MG/DL (ref 70–99)
HCT VFR BLD AUTO: 39.5 %
HGB BLD-MCNC: 13.2 G/DL
IMM GRANULOCYTES # BLD AUTO: 0.02 X10(3) UL (ref 0–1)
IMM GRANULOCYTES NFR BLD: 0.2 %
LYMPHOCYTES # BLD AUTO: 1.2 X10(3) UL (ref 1–4)
LYMPHOCYTES NFR BLD AUTO: 14.5 %
MCH RBC QN AUTO: 27.7 PG (ref 26–34)
MCHC RBC AUTO-ENTMCNC: 33.4 G/DL (ref 31–37)
MCV RBC AUTO: 82.8 FL
MDMA UR QL SCN: NEGATIVE
MONOCYTES # BLD AUTO: 0.46 X10(3) UL (ref 0.1–1)
MONOCYTES NFR BLD AUTO: 5.5 %
NEUTROPHILS # BLD AUTO: 6.32 X10 (3) UL (ref 1.5–7.7)
NEUTROPHILS # BLD AUTO: 6.32 X10(3) UL (ref 1.5–7.7)
NEUTROPHILS NFR BLD AUTO: 76.3 %
OPIATES UR QL SCN: NEGATIVE
OSMOLALITY SERPL CALC.SUM OF ELEC: 283 MOSM/KG (ref 275–295)
OXYCODONE UR QL SCN: NEGATIVE
PLATELET # BLD AUTO: 330 10(3)UL (ref 150–450)
POTASSIUM SERPL-SCNC: 4 MMOL/L (ref 3.5–5.1)
RBC # BLD AUTO: 4.77 X10(6)UL
SARS-COV-2 RNA RESP QL NAA+PROBE: NOT DETECTED
SODIUM SERPL-SCNC: 137 MMOL/L (ref 136–145)
WBC # BLD AUTO: 8.3 X10(3) UL (ref 4–11)

## 2023-12-29 PROCEDURE — 99285 EMERGENCY DEPT VISIT HI MDM: CPT

## 2023-12-29 PROCEDURE — 85025 COMPLETE CBC W/AUTO DIFF WBC: CPT | Performed by: EMERGENCY MEDICINE

## 2023-12-29 PROCEDURE — 80048 BASIC METABOLIC PNL TOTAL CA: CPT | Performed by: EMERGENCY MEDICINE

## 2023-12-29 PROCEDURE — 80307 DRUG TEST PRSMV CHEM ANLYZR: CPT | Performed by: EMERGENCY MEDICINE

## 2023-12-29 PROCEDURE — 36415 COLL VENOUS BLD VENIPUNCTURE: CPT

## 2023-12-29 PROCEDURE — 82077 ASSAY SPEC XCP UR&BREATH IA: CPT | Performed by: EMERGENCY MEDICINE

## 2023-12-29 RX ORDER — ALPRAZOLAM 0.25 MG/1
0.25 TABLET ORAL ONCE
Status: COMPLETED | OUTPATIENT
Start: 2023-12-29 | End: 2023-12-29

## 2023-12-29 NOTE — ED INITIAL ASSESSMENT (HPI)
Patient brought to ED for psychiatric problem. Patient was found in his neighbors' yard after trespassing when neighbors told him to stop. Patient arrives making rambling statements about [facility]. Patient states he was in his neighbor's yard because, \"I heard on the news about Muslims' homes blowing up and shit and I wanted to protect them\". Patient denies SI/HI. States he has been admitted psychiatrically before and prefers [facility]. Arrives A/Ox4.

## 2023-12-30 VITALS
TEMPERATURE: 98 F | OXYGEN SATURATION: 96 % | DIASTOLIC BLOOD PRESSURE: 91 MMHG | SYSTOLIC BLOOD PRESSURE: 135 MMHG | RESPIRATION RATE: 18 BRPM | HEIGHT: 70 IN | HEART RATE: 97 BPM | WEIGHT: 315 LBS | BODY MASS INDEX: 45.1 KG/M2

## 2023-12-30 PROBLEM — F20.9 SCHIZOPHRENIA (HCC): Status: ACTIVE | Noted: 2023-12-30

## 2023-12-30 NOTE — ED QUICK NOTES
[facility] arrived for transport to [facility]. Pt calm and cooperative for transport.

## 2023-12-30 NOTE — BH LEVEL OF CARE ASSESSMENT
Crisis Evaluation Assessment    Louie Downing YOB: 2000   Age 26year old MRN [id number]   Location [address] Attending Audrea Fothergill      Patient's legal sex: male  Patient identifies as: male  Patient's birth sex: male  Preferred pronouns: She/Her    Date of Service: 12/30/2023    Referral Source:  Referral Source  Where was crisis eval performed?: On-site  Referral Source: Legal  Referral Source Info: police    Reason for Crisis Evaluation   Ravi Becerra, 52-year-old male, presents to [facility] by EMS due to disorganized behaviors in neighbors yard. Patient present increase kayla, racing thoughts and ideas, and responding to internal stimuli. Patient has history of behavioral health inpatient outpatient behavioral health treatment. Patient has been medication noncompliant. Patient's family has been concerned about patient's behavior within the last month. Patient has not been able to sleep for at least the last 3 days. Per patient, \" I was trying to get closer to my neighbor who is a Gnosticism. I am learning about the practice of Scientology and I wanted to find out more information. My neighbor became scared and called 911. When the police came I was calm and agreed to come to the emergency room. My family was here with me supporting me. I love music I love making music and I can speak in several different languages. I guess I can go to the hospital to prove that I am not crazy. \"    Collateral  Michael Hernandezty  Mother  [phone]    Per mother, his behavior has been disorganized since Thanksgiving. He has a history of inpatient hospitalization for manic behaviors. I am really happy that the neighbor called 911 to get him some help. We has his family has been trying to get him help since November. He refused.  He has not been compliant with his medications and when that happens he becomes overly talkative, increased spending habits, start to say things that does not make any sense, and decrease in sleep. He has not been the sleep in several days. We want him to get back stable on his medications. He enjoys singing and making music. \"      Suicide Crisis Syndrome:  Suicide Crisis Syndrome  Do you feel trapped with no good options left?: Yes  Are you overwhelmed, or have you lost control by negative thoughts filling your head? : No    Suicide Risk Assessments:  Source of information for CSSR: Patient  In what setting is the screener performed?: in person  1. Have you wished you were dead or wished you could go to sleep and not wake up? (past 30 days): No  2. Have you actually had any thoughts of killing yourself? (past 30 days): No              6. Have you ever done anything, started to do anything, or prepared to do anything to end your life? (lifetime): No     Score - BH OV: No Risk        Protective Factors: Patient reports family as protective factors  Past Suicidal Ideation: Denies            Loss or Near Loss by Suicide of Family or Friend?: No          Patient has history of inpatient outpatient behavioral health treatment. Patient denies feeling suicidal/homicidal ideations. Non-Suicidal Self-Injury:   Patient denies history or current self harming behaviors. Risk to Others  Patient denies history or current homicidal ideations. Per mother, patient never presents a danger to himself or others.       Access to Means:  Access to Means  Has access to means to attempt suicide, self-injure, harm others, or damage property?: Yes  Description of Access: Household items, enviromental  Discussion of Removal of Access to Means: Discuss upon discharge  Access to Firearm/Weapon: No  Discussion of Removal of Firearm/Weapon: Patient denies access  Do you have a firearm owner identification ([place] ) card?: No    Protective Factors:   Protective Factors: Patient reports family as protective factors    Review of Psychiatric Systems:  Patient denies hearing voices, or thinking others are plotting against him. Patient admits to seeing dark shadows at times. Patient reports manic behaviors, not able to sleep, and eating 3 meals per day. Substance Use:  Test dated 12/29/2023 results negative for any controlled substances. Patient denies using any controlled substances. Withdrawal Symptoms  Current Withdrawal Symptoms: No  Functional Achievement:   Patient can independently complete all ADLs grooming, bathing, dressing, feeding, transfer to toilet, and continent. Patient can independently ambulate without durable medical equipment. Ability to Care for Self[de-identified]   Patient can independently complete IADLs. Patient receives assistance from family residing in the home. Current Treatment and Treatment History:  Patient has history inpatient/outpatient behavioral health treatment. Last hospitalization, 2023 due to disorganized behaviors. Patient patient denies any current outpatient treatment providers. Patient has been noncompliant with medications. Patient diagnoses major depression, remission status unspecified, unspecified psychosis, and generalized anxiety. School/Work Performance:  Patient reports working at [employer]. Patient did not provide details regarding full-time/part-time or work attendance. Relevant Social History:  Per mother family has history of schizophrenia.   Patient lives with family, denies legal history, feels family are his protective factors        Hi and Complex (as applicable):     urrent Medical (as applicable):  Current Medical  Do you have a Primary Care Physician?: Yes  Primary Care Physician Name: Montgomery Peabody  Does the Patient Have: None  Active Eating Disorder: No (Hx of binge eating)    EDP Assessment (as applicable):  IBW Calculations  Weight: (!) 374 lb 12.5 oz  BMI (Calculated): 53.8  IBW LBS [place]: 166 LBS  IBW %: 225.77 %  IBW + 10%: 182.6 LBS  IBW - 10%: 149.4 LBS Abuse Assessment:  Abuse Assessment  Physical Abuse: Denies  Verbal Abuse: Denies  Sexual Abuse: Denies  Neglect: Denies  Does anyone say or do something to you that makes you feel unsafe?: No  Have You Ever Been Harmed by a Partner/Caregiver?: No  Health Concerns r/t Abuse: No    Mental Status Exam:   General Appearance  Characteristics: Appropriate clothing  Eye Contact: Direct  Psychomotor Behavior  Gait/Movement: Normal  Abnormal movements: None  Posture: Relaxed  Rate of Movement: Normal  Mood and Affect  Mood or Feelings: Calm  Appropriateness of Affect: Congruent to mood  Range of Affect: Normal  Stability of Affect: Stable  Attitude toward staff: Co-operative  Speech  Rate of Speech: Appropriate  Flow of Speech: Appropriate  Intensity of Volume: Ordinary  Clarity: Clear  Cognition  Concentration: Unimpaired  Memory: Recent memory intact; Remote memory intact  Orientation Level: Oriented X4;Oriented to person;Oriented to place;Oriented to time;Oriented to situation  Insight: Poor  Fair/poor insight as evidenced by: Patient presents poor insight  Judgment: Poor  Fair/poor judgment as evidenced by: Patient presents poor judgment  Thought Patterns  Clarity/Relevance: Irrelevant to topic  Flow: Pressured;Flight of ideas; Tangential  Content: Grandiose  Level of Consciousness: Alert  Level of Consciousness: Alert  Behavior  Exhibited behavior: Appropriate to situation;Participated      Disposition:    Rationale for Treatment Recommendation:   Sandi Win, 25-year-old male, presents to [place] by EMS due to disorganized behaviors in his neighbors yard. Patient presents alert, oriented x 2, mood anxious, behavior disorganized, speech pressured, memory intact. Patient has history of inpatient/outpatient behavioral health treatment and been noncompliant with medication. Patient denies suicidal ( CSSRS low risk)/homicidal, self harming behaviors, legal history, eating disorder, and hearing voices.   Patient denies usage of controlled substances. Patient has presented disorganized behaviors since November 2023, last hospitalization was summer 2023. Patient has been unable to sleep for several days and responding to internal stimuli. Risk/Protective Factors  Protective Factors: Patient reports family as protective factors    Level of Care Recommendations  Consulted with: Dr. Lexi Muse of Care Recommendation: Inpatient Acute Care  Unit: B2  Reason for Unit Assigned: age/symptoms  Inpatient Criteria: Inability to care for self  Behavioral Precautions: Close Observation  Refused Treatment: No  Education Provided: Call 911 in an Emergency;[facility] Crisis Line Number;Advised to call if condition worsens; Advised to call with questions  Sign-In  Paperwork Signed: Voluntary Admission Form  Inpatient Admission Type: Adult Voluntary Signed  Patient Provided: Rights of Individuals Receiving MH/DD Services;Rights of Petitioned Admittee;Copy of Petition  Patient Verbalized Understanding: Yes      Diagnoses with F-Codes:  Primary Psychiatric Diagnosis  Unspecified Psychosis F 29     Secondary Psychiatric Diagnoses  Generalized Anxiety  F41.1    Pervasive Diagnoses (as applicable)  NA   Pertinent Non-Psychiatric Diagnoses  ANEL LOPEZ

## 2023-12-30 NOTE — ED NOTES
This writer contacted mother  Titus Fleischer Mother   [phone]    Per mother, his behavior has been disorganized since Thanksgiving. He has a history of inpatient hospitalization for manic behaviors. I am really happy that the neighbor called 911 to get him some help. We has his family has been trying to get him help since November. He refused. He has not been compliant with his medications and when that happens he becomes overly talkative, increased spending habits, start to say things that does not make any sense, and decrease in sleep. He has not been the sleep in several days. We want him to get back stable on his medications. He enjoys singing and making music. \"

## 2023-12-30 NOTE — ED QUICK NOTES
Writer gave RN to RN report to [place] at [facility] at [phone]. [place] reports we can book transport at anytime.

## 2023-12-30 NOTE — ED NOTES
Late Entry    This writer presented to the bedside of patient to complete LOC order by physician. This writer introduced self and purpose of assessment. Patient presented increase kayla, pressured speech, mumbling about his afflictions to [employer] community. Patient memory was intact regarding how he presented to ER. Patient requested this writer to speak with his family. Emily Serrano  completed Voluntary Admission: I have reviewed the Rights of Voluntary Admittee and the Rights of Individuals Receiving Mental Health and Developmental Disability Services   with the patient. I have given a copy of these rights and a copy of the Application For Voluntary Admission to the patient.

## 2024-01-10 PROBLEM — F31.9 AFFECTIVE PSYCHOSIS, BIPOLAR (HCC): Status: ACTIVE | Noted: 2024-01-10

## 2024-02-28 PROBLEM — F41.8 DEPRESSION WITH ANXIETY: Status: RESOLVED | Noted: 2023-03-07 | Resolved: 2024-02-28

## 2024-02-28 PROBLEM — F32.9 MAJOR DEPRESSIVE DISORDER: Status: RESOLVED | Noted: 2023-04-21 | Resolved: 2024-02-28

## 2024-02-28 PROBLEM — E66.01 CLASS 3 SEVERE OBESITY WITHOUT SERIOUS COMORBIDITY WITH BODY MASS INDEX (BMI) OF 50.0 TO 59.9 IN ADULT (HCC): Status: RESOLVED | Noted: 2023-03-07 | Resolved: 2024-02-28

## 2024-04-18 ENCOUNTER — HOSPITAL ENCOUNTER (EMERGENCY)
Facility: HOSPITAL | Age: 24
Discharge: HOME | End: 2024-04-18
Attending: EMERGENCY MEDICINE
Payer: OTHER MISCELLANEOUS

## 2024-04-18 VITALS
OXYGEN SATURATION: 97 % | DIASTOLIC BLOOD PRESSURE: 84 MMHG | SYSTOLIC BLOOD PRESSURE: 157 MMHG | WEIGHT: 315 LBS | RESPIRATION RATE: 20 BRPM | HEIGHT: 69 IN | HEART RATE: 86 BPM | BODY MASS INDEX: 46.65 KG/M2 | TEMPERATURE: 98 F

## 2024-04-18 DIAGNOSIS — M54.50 ACUTE RIGHT-SIDED LOW BACK PAIN, UNSPECIFIED WHETHER SCIATICA PRESENT: Primary | ICD-10-CM

## 2024-04-18 PROCEDURE — 99283 EMERGENCY DEPT VISIT LOW MDM: CPT

## 2024-04-18 PROCEDURE — 96372 THER/PROPH/DIAG INJ SC/IM: CPT

## 2024-04-18 RX ORDER — ACETAMINOPHEN 500 MG
1000 TABLET ORAL ONCE
Status: DISCONTINUED | OUTPATIENT
Start: 2024-04-18 | End: 2024-04-18

## 2024-04-18 RX ORDER — KETOROLAC TROMETHAMINE 15 MG/ML
15 INJECTION, SOLUTION INTRAMUSCULAR; INTRAVENOUS ONCE
Status: COMPLETED | OUTPATIENT
Start: 2024-04-18 | End: 2024-04-18

## 2024-04-18 RX ORDER — CYCLOBENZAPRINE HCL 10 MG
10 TABLET ORAL ONCE
Status: COMPLETED | OUTPATIENT
Start: 2024-04-18 | End: 2024-04-18

## 2024-04-18 NOTE — ED INITIAL ASSESSMENT (HPI)
Patient arrives with reports of lower right back pain starting Monday. States it has been getting worse as the week goes on. Denies injury. Took tylenol 10am with some relief.

## 2024-04-18 NOTE — ED PROVIDER NOTES
Patient Seen in: [facility] Emergency Department      History     Chief Complaint   Patient presents with    Back Pain     Stated Complaint: back Pain    Subjective:   HPI        Objective:   Past Medical History:    Anxiety    Eczema    Headache disorder    Obesity              History reviewed. No pertinent surgical history.             Social History     Socioeconomic History    Marital status: Single   Tobacco Use    Smoking status: Never    Smokeless tobacco: Never    Tobacco comments:     marijuana   Vaping Use    Vaping status: Never Used   Substance and Sexual Activity    Alcohol use: Not Currently    Drug use: Not Currently     Types: Cannabis   Other Topics Concern    Caffeine Concern No              Review of Systems    Positive for stated complaint: back Pain  Other systems are as noted in HPI.  Constitutional and vital signs reviewed.      All other systems reviewed and negative except as noted above.    Physical Exam     ED Triage Vitals [04/18/24 1111]   /84   Pulse 86   Resp 20   Temp 98.2 °F (36.8 °C)   Temp src Temporal   SpO2 97 %   O2 Device None (Room air)       Current:/84   Pulse 86   Temp 98.2 °F (36.8 °C) (Temporal)   Resp 20   Ht 175.3 cm (5' 9\")   Wt (!) 158.8 kg   SpO2 97%   BMI 51.69 kg/m²         Physical Exam          ED Course   Labs Reviewed - No data to display                   MDM      23-year-old male without significant past medical history presents with low back pain.  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  Symptoms were getting better, but he exacerbated the injury today.  Currently with moderate to severe pain in the right lower back without radiation.  Denies fevers, numbness/tingling/weakness, urinary retention, or other symptoms.  Took Tylenol prior to arrival with some improvement in symptoms.    On exam, vitals normal, well-appearing, mild tenderness to palpation in the right paraspinal lumbar area  without midline tenderness.  Neurovascularly intact distally.    Differential: Muscular strain versus herniated disc    Patient given Toradol, Flexeril, and lidocaine patch and discharged with analgesic instructions and return precautions.                                   MDM    Disposition and Plan     Clinical Impression:  1. Acute right-sided low back pain, unspecified whether sciatica present         Disposition:  Discharge  4/18/2024  1:53 pm    Follow-up:  Hayes Romeo, DO  [address] [id number] [phone]    Schedule an appointment as soon as possible for a visit in 1 week(s)  As needed    [facility]  [address] [facility] [address]  [phone]  Call in 1 day  Work related injury follow up          Medications Prescribed:  Discharge Medication List as of 4/18/2024  2:28 PM

## 2024-07-04 RX ORDER — ARIPIPRAZOLE 2 MG/1
2 TABLET ORAL DAILY
Qty: 30 TABLET | Refills: 11 | OUTPATIENT
Start: 2024-07-04

## 2024-07-04 NOTE — TELEPHONE ENCOUNTER
Aripiprazole 2mg:patient is on Abilify 15mg tablets from office visit 02/28/2024. Psychiatry normally does refills on Abilify.

## (undated) NOTE — Clinical Note
5/23/2017              Quan Rodriguez        [id number]        [address] [facility] [address]         To Whom it may concern: This is to certify that Joanna Solorzano had an appointment on 5/23/2017 at 4:24 PM with DO. Nasim Tracey P.E. [facility]

## (undated) NOTE — LETTER
VACCINE ADMINISTRATION RECORD  PARENT / GUARDIAN APPROVAL  Date: 2018  Vaccine administered to: Guru Balderas     : 2000    MRN: [id number]    A copy of the appropriate Centers for Disease Control and Prevention Vaccine Information statemen

## (undated) NOTE — ED AVS SNAPSHOT
[facility] Emergency Department    [address] [address]    Phone:  [phone]    Fax:  [phone]           Grace Bia   MRN: [id number]    Department:  [facility] Emergency Department   Date of Visit:  2/1 service to you, we would appreciate any positive or negative feedback related to the care you received in our emergency department. Please call our [address] [facility] [facility] at [phone]. Your Emergency Department team is here to serve you.   You are our top priori I certified that I have received a copy of the aftercare instructions; that these instructions have been explained to me; all questions pertaining to these instructions have been answered in a satisfactory manner.         [address] Call [phone] for help. [employer] is NOT to be used for urgent needs. For medical emergencies, dial 911.

## (undated) NOTE — MR AVS SNAPSHOT
[facility] SPECIAL[facility] hospitals - [place] [address] [address] [address]  [phone]               Thank you for choosing us for your health care visit with Katlyn Duarte DO.   We are glad to serve you and happy to provide you with this summary of Fact Sheet: Healthy Active Living for Families    Healthy nutrition starts as early as infancy with breastfeeding. Once your baby begins eating solid foods, introduce nutritious foods early on and often.  Sometimes toddlers need to try a food 10 times befor

## (undated) NOTE — ED AVS SNAPSHOT
[facility] Emergency Department    [address] [address]    Phone:  [phone]    Fax:  [phone]           Meredith Alu   MRN: [id number]    Department:  [facility] Emergency Department   Date of Visit:  2/1 and Class Registration line at [phone] or find a doctor online by visiting [email or web address].    IF THERE IS ANY CHANGE OR WORSENING OF YOUR CONDITION, CALL YOUR PRIMARY CARE PHYSICIAN AT ONCE OR RETURN IMMEDIATELY TO [address] [place] [address].     If

## (undated) NOTE — LETTER
1/14/2019              Quan Rodriguez        [id number]        [address] [facility] [address]         To Whom It may concern,    Fidel Gong is currently a patient under my medical care.  If you require any further information please contact my o